# Patient Record
Sex: MALE | Race: WHITE | Employment: FULL TIME | URBAN - METROPOLITAN AREA
[De-identification: names, ages, dates, MRNs, and addresses within clinical notes are randomized per-mention and may not be internally consistent; named-entity substitution may affect disease eponyms.]

---

## 2017-01-13 DIAGNOSIS — E78.5 HYPERLIPIDEMIA WITH TARGET LDL LESS THAN 130: Primary | ICD-10-CM

## 2017-01-13 RX ORDER — ATORVASTATIN CALCIUM 20 MG/1
TABLET, FILM COATED ORAL
Qty: 90 TABLET | Refills: 2 | Status: SHIPPED | OUTPATIENT
Start: 2017-01-13 | End: 2023-06-09

## 2017-01-13 NOTE — TELEPHONE ENCOUNTER
Prescription approved per [facility] Refill Protocol.  Maria Esther Baker RN

## 2017-01-13 NOTE — TELEPHONE ENCOUNTER
atorvastatin (LIPITOR) 20 MG tablet 90 tablet 1 5/20/2016          Last Written Prescription Date: 05/20/2016  Last Fill Quantity: 90, # refills: 1  Last Office Visit with [facility], [facility] or [facility] prescribing provider: 10/31/2016       CHOL      182   10/31/2016  HDL       50   10/31/2016  LDL       80   10/31/2016  TRIG      259   10/31/2016  CHOLHDLRATIO      3.2   10/5/2015

## 2017-03-02 ENCOUNTER — OFFICE VISIT (OUTPATIENT)
Dept: FAMILY MEDICINE | Facility: CLINIC | Age: 57
End: 2017-03-02
Payer: COMMERCIAL

## 2017-03-02 VITALS
WEIGHT: 212.5 LBS | OXYGEN SATURATION: 98 % | TEMPERATURE: 97.5 F | HEART RATE: 58 BPM | SYSTOLIC BLOOD PRESSURE: 124 MMHG | RESPIRATION RATE: 16 BRPM | DIASTOLIC BLOOD PRESSURE: 68 MMHG | BODY MASS INDEX: 33.35 KG/M2 | HEIGHT: 67 IN

## 2017-03-02 DIAGNOSIS — J01.90 ACUTE SINUSITIS WITH SYMPTOMS > 10 DAYS: Primary | ICD-10-CM

## 2017-03-02 PROCEDURE — 99213 OFFICE O/P EST LOW 20 MIN: CPT | Performed by: PHYSICIAN ASSISTANT

## 2017-03-02 RX ORDER — AMOXICILLIN 875 MG
875 TABLET ORAL 2 TIMES DAILY
Qty: 20 TABLET | Refills: 0 | Status: SHIPPED | OUTPATIENT
Start: 2017-03-02 | End: 2023-06-09

## 2017-03-02 NOTE — PROGRESS NOTES
SUBJECTIVE:                                                    Jj Stallworth is a 56 year old male who presents to clinic today for the following health issues:      RESPIRATORY SYMPTOMS      Duration: 3 weeks    Description  nasal congestion, cough and intermittent fever    Severity: moderate    Accompanying signs and symptoms: None    History (predisposing factors):  none    Precipitating or alleviating factors: None    Therapies tried and outcome:  Dayquil, Nyquil   Sinus pain on maxillary sinuses B/L.   Cough is productive of mucus, but his worse in head/ facial area.  Fever off and on -- avg. temp .    Problem list and histories reviewed & adjusted, as indicated.  Additional history: as documented    Patient Active Problem List   Diagnosis     Hyperlipidemia with target LDL less than 130     Hypertriglyceridemia     Hyperplastic colon polyp     Obesity     Genital herpes     Low HDL (under 40)     Past Surgical History   Procedure Laterality Date     C nonspecific procedure  2002     fatty tumor removed from posterior neck  - done at [facility] in [place], [place]     Hemorrhoidectomy  2003     rubber band ligation     C nonspecific procedure  July 2003     treamill stress myocardial perfusion scan - done in [place], [place] - negative for ischemia or infarct.       Social History   Substance Use Topics     Smoking status: Former Smoker     Packs/day: 1.00     Years: 5.00     Types: Cigarettes     Quit date: 1/1/1978     Smokeless tobacco: Never Used     Alcohol use 0.0 oz/week     0 Standard drinks or equivalent per week      Comment: Socially- 2 drinks per week     Family History   Problem Relation Age of Onset     Arthritis Mother      Respiratory Mother      Bronchitis     Alcohol/Drug Father      Arthritis Father      Blood Disease Father      Partial blood clot- On blood thinner     Lipids Father      On med     Arthritis Maternal Grandmother      Arthritis Maternal Grandfather      HEART  DISEASE Maternal Grandfather      DIABETES Paternal Grandmother      Arthritis Paternal Grandmother      HEART DISEASE Paternal Grandmother      Arthritis Paternal Grandfather      CANCER Paternal Grandfather      Bone Cancer     HEART DISEASE Paternal Grandfather      Asthma Brother      Cancer - colorectal Paternal Aunt      dx age 60     Prostate Cancer No family hx of          Current Outpatient Prescriptions   Medication Sig Dispense Refill     atorvastatin (LIPITOR) 20 MG tablet TAKE 1 TABLET BY MOUTH DAILY 90 tablet 2     ibuprofen (ADVIL,MOTRIN) 800 MG tablet TAKE 1 TABLET BY MOUTH EVERY EIGHT HOURS AS NEEDED FOR PAIN 180 tablet 1     valACYclovir (VALTREX) 1000 mg tablet Take 1 tablet (1,000 mg) by mouth daily 60 tablet 5     diclofenac (VOLTAREN) 1 % GEL Apply 4 grams to knees or 2 grams to hands four times daily using enclosed dosing card. 100 g 5     sildenafil (VIAGRA) 50 MG tablet Take 1 tablet by mouth. 1 tablet at least 30 minutes before intercourse. 6 tablet 6     Allergies   Allergen Reactions     No Known Drug Allergy        Reviewed and updated as needed this visit by clinical staff       Reviewed and updated as needed this visit by Provider         ROS:  C: POSITIVE for fever  INTEGUMENTARY/SKIN: NEGATIVE for worrisome rashes, moles or lesions  E/M: POSITIVE for congestion, runny nose and facial pain  R: POSITIVE for cough  CV: NEGATIVE for chest pain, palpitations or peripheral edema  GI: NEGATIVE for nausea, abdominal pain, heartburn, or change in bowel habits  : NEGATIVE for dysuria, hematuria, decreased urinary stream or discharge  MUSCULOSKELETAL: NEGATIVE for significant arthralgias or myalgia  NEURO: NEGATIVE for weakness, dizziness or paresthesias  ENDOCRINE: NEGATIVE for cold intolerance, HX diabetes and HX thyroid disease  HEME/ALLERGY/IMMUNE: NEGATIVE for swollen nodes    OBJECTIVE:                                                    /68 (BP Location: Left arm, Patient  "Position: Chair, Cuff Size: Adult Regular)  Pulse 58  Temp 97.5  F (36.4  C) (Tympanic)  Resp 16  Ht 5' 7\" (1.702 m)  Wt 212 lb 8 oz (96.4 kg)  SpO2 98%  BMI 33.28 kg/m2  Body mass index is 33.28 kg/(m^2).  GENERAL: healthy, alert and no distress  EYES: Eyes grossly normal to inspection, PERRL and conjunctivae and sclerae normal  HENT: normal cephalic/atraumatic, ear canals and TM's normal, nasal mucosa edematous , oropharynx clear, oral mucous membranes moist, tonsillar erythema and sinuses: maxillary tenderness on bilateral  NECK: no adenopathy, no asymmetry, masses, or scars and thyroid normal to palpation  RESP: lungs clear to auscultation - no rales, rhonchi or wheezes  CV: regular rate and rhythm, normal S1 S2, no S3 or S4, no murmur, click or rub, no peripheral edema and peripheral pulses strong  ABDOMEN: soft, nontender, no hepatosplenomegaly, no masses and bowel sounds normal  MS: no gross musculoskeletal defects noted, no edema    Diagnostic Test Results:  none      ASSESSMENT/PLAN:                                                    1. Acute sinusitis with symptoms > 10 days        Push fluids and rest. Humidified air at night.   - amoxicillin (AMOXIL) 875 MG tablet; Take 1 tablet (875 mg) by mouth 2 times daily  Dispense: 20 tablet; Refill: 0  I have discussed any lab or imaging results, the patient's diagnosis, and my plan of treatment with the patient and/or family. Patient is aware to come back in with worsening symptoms or if no relief despite treatment plan.  Patient voiced understanding and had no further questions.     Rhonda Woody PA-C  [facility]    "

## 2017-03-02 NOTE — PATIENT INSTRUCTIONS
Push fluids and rest  Claritin is an option for persistent cough  Humidified air  Acute Bacterial Rhinosinusitis (ABRS)  Acute bacterial rhinosinusitis (ABRS) is an infection of your nasal cavity and sinuses. It s caused by bacteria. Acute means that you ve had symptoms for less than 12 weeks.  Understanding your sinuses  The nasal cavity is the large air-filled space behind your nose. The sinuses are a group of spaces formed by the bones of your face. They connect with your nasal cavity. ABRS causes the tissue lining these spaces to become inflamed. Mucus may not drain normally. This leads to facial pain and other symptoms.  What causes ABRS?  ABRS most often follows an upper respiratory infection caused by a virus. Bacteria then infect the lining of your nasal cavity and sinuses. But you can also get ABRS if you have:    Nasal allergies    Long-term nasal swelling and congestion not caused by allergies    Blockage in the nose  Symptoms of ABRS  The symptoms of ABRS may be different for each person, and can include:    Nasal congestion    Runny nose    Fluid draining from the nose down the throat (postnasal drip)    Headache    Cough    Pain in the sinuses    Thick, colored fluid from the nose (mucus)    Fever  Diagnosing ABRS  ABRS may be diagnosed if you ve had an upper respiratory infection like a cold and cough for longer than 10 to 14 days. Your health care provider will ask about your symptoms and your medical history. The provider will check your vital signs, including your temperature. You ll have a physical exam. The health care provider will check your ears, nose, and throat. You likely won t need any tests. If ABRS comes back, you may have a culture or other tests.  Treatment for ABRS  Treatment may include:    Antibiotic medicine. This is for symptoms that last for at least 10 to 14 days.    Nasal corticosteroid medicine. Drops or spray used in the nose can lessen swelling and  congestion.    Over-the-counter pain medicine. This is to lessen sinus pain and pressure.    Nasal decongestant medicine. Spray or drops may help to lessen congestion. Do not use them for more than a few days.    Salt wash (saline irrigation). This can help to loosen mucus.  Possible complications of ABRS  ABRS may come back or become long-term (chronic).  In rare cases, ABRS may cause complications such as:     Inflamed tissue around the brain and spinal cord (meningitis)    Inflamed tissue around the eyes (orbital cellulitis)    Inflamed bones around the sinuses (osteitis)  These problems may need to be treated in a hospital with intravenous (IV) antibiotic medicine or surgery.  When to call the health care provider  Call your health care provider if you have any of the following:    Symptoms that don t get better, or get worse    Symptoms that don t get better after 3 to 5 days on antibiotics    Trouble seeing    Swelling around your eyes    Confusion or trouble staying awake        3819-1583 [employer]. [address] [place] [address], [place] [address]. All rights reserved. This information is not intended as a substitute for professional medical care. Always follow your healthcare professional's instructions.

## 2017-03-02 NOTE — MR AVS SNAPSHOT
After Visit Summary   3/2/2017    Jj Stallworth    MRN: [id number]           Patient Information     Date Of Birth          1960        Visit Information        Provider Department      3/2/2017 9:20 AM Rhonda Woody PA-C [facility]        Today's Diagnoses     Acute sinusitis with symptoms > 10 days    -  1      Care Instructions    Push fluids and rest  Claritin is an option for persistent cough  Humidified air  Acute Bacterial Rhinosinusitis (ABRS)  Acute bacterial rhinosinusitis (ABRS) is an infection of your nasal cavity and sinuses. It s caused by bacteria. Acute means that you ve had symptoms for less than 12 weeks.  Understanding your sinuses  The nasal cavity is the large air-filled space behind your nose. The sinuses are a group of spaces formed by the bones of your face. They connect with your nasal cavity. ABRS causes the tissue lining these spaces to become inflamed. Mucus may not drain normally. This leads to facial pain and other symptoms.  What causes ABRS?  ABRS most often follows an upper respiratory infection caused by a virus. Bacteria then infect the lining of your nasal cavity and sinuses. But you can also get ABRS if you have:    Nasal allergies    Long-term nasal swelling and congestion not caused by allergies    Blockage in the nose  Symptoms of ABRS  The symptoms of ABRS may be different for each person, and can include:    Nasal congestion    Runny nose    Fluid draining from the nose down the throat (postnasal drip)    Headache    Cough    Pain in the sinuses    Thick, colored fluid from the nose (mucus)    Fever  Diagnosing ABRS  ABRS may be diagnosed if you ve had an upper respiratory infection like a cold and cough for longer than 10 to 14 days. Your health care provider will ask about your symptoms and your medical history. The provider will check your vital signs, including your temperature. You ll have a physical exam. The health care  provider will check your ears, nose, and throat. You likely won t need any tests. If ABRS comes back, you may have a culture or other tests.  Treatment for ABRS  Treatment may include:    Antibiotic medicine. This is for symptoms that last for at least 10 to 14 days.    Nasal corticosteroid medicine. Drops or spray used in the nose can lessen swelling and congestion.    Over-the-counter pain medicine. This is to lessen sinus pain and pressure.    Nasal decongestant medicine. Spray or drops may help to lessen congestion. Do not use them for more than a few days.    Salt wash (saline irrigation). This can help to loosen mucus.  Possible complications of ABRS  ABRS may come back or become long-term (chronic).  In rare cases, ABRS may cause complications such as:     Inflamed tissue around the brain and spinal cord (meningitis)    Inflamed tissue around the eyes (orbital cellulitis)    Inflamed bones around the sinuses (osteitis)  These problems may need to be treated in a hospital with intravenous (IV) antibiotic medicine or surgery.  When to call the health care provider  Call your health care provider if you have any of the following:    Symptoms that don t get better, or get worse    Symptoms that don t get better after 3 to 5 days on antibiotics    Trouble seeing    Swelling around your eyes    Confusion or trouble staying awake        8522-0280 [employer]. [address] [place] [address], [place] [address]. All rights reserved. This information is not intended as a substitute for professional medical care. Always follow your healthcare professional's instructions.              Follow-ups after your visit        Who to contact     If you have questions or need follow up information about today's clinic visit or your schedule please contact [facility] directly at [phone].  Normal or non-critical lab and imaging results will be communicated to you by MyChart, letter or phone within 4  "business days after the clinic has received the results. If you do not hear from us within 7 days, please contact the clinic through [employer] or phone. If you have a critical or abnormal lab result, we will notify you by phone as soon as possible.  Submit refill requests through [employer] or call your pharmacy and they will forward the refill request to us. Please allow 3 business days for your refill to be completed.          Additional Information About Your Visit        [employer] Information     [employer] gives you secure access to your electronic health record. If you see a primary care provider, you can also send messages to your care team and make appointments. If you have questions, please call your primary care clinic.  If you do not have a primary care provider, please call [phone] and they will assist you.        Care EveryWhere ID     This is your Care EveryWhere ID. This could be used by other organizations to access your [place] medical records  [id number]        Your Vitals Were     Pulse Temperature Respirations Height Pulse Oximetry BMI (Body Mass Index)    58 97.5  F (36.4  C) (Tympanic) 16 5' 7\" (1.702 m) 98% 33.28 kg/m2       Blood Pressure from Last 3 Encounters:   03/02/17 124/68   10/31/16 112/73   10/05/15 137/85    Weight from Last 3 Encounters:   03/02/17 212 lb 8 oz (96.4 kg)   10/31/16 201 lb (91.2 kg)   10/05/15 204 lb (92.5 kg)              Today, you had the following     No orders found for display         Today's Medication Changes          These changes are accurate as of: 3/2/17  9:31 AM.  If you have any questions, ask your nurse or doctor.               Start taking these medicines.        Dose/Directions    amoxicillin 875 MG tablet   Commonly known as:  AMOXIL   Used for:  Acute sinusitis with symptoms > 10 days   Started by:  Rhonda Woody PA-C        Dose:  875 mg   Take 1 tablet (875 mg) by mouth 2 times daily   Quantity:  20 tablet   Refills:  0            Where to " get your medicines      These medications were sent to [facility] [place] - [place], [place] - [address]  [address], [facility] [address]     Phone:  [phone]     amoxicillin 875 MG tablet                Primary Care Provider Office Phone # Fax #    Dilip Bocanegra Bolivar Frank [phone]       [facility] [address]  [place] [address]        Thank you!     Thank you for choosing [facility]  for your care. Our goal is always to provide you with excellent care. Hearing back from our patients is one way we can continue to improve our services. Please take a few minutes to complete the written survey that you may receive in the mail after your visit with us. Thank you!             Your Updated Medication List - Protect others around you: Learn how to safely use, store and throw away your medicines at www.disposemymeds.org.          This list is accurate as of: 3/2/17  9:31 AM.  Always use your most recent med list.                   Brand Name Dispense Instructions for use    amoxicillin 875 MG tablet    AMOXIL    20 tablet    Take 1 tablet (875 mg) by mouth 2 times daily       atorvastatin 20 MG tablet    LIPITOR    90 tablet    TAKE 1 TABLET BY MOUTH DAILY       diclofenac 1 % Gel topical gel    VOLTAREN    100 g    Apply 4 grams to knees or 2 grams to hands four times daily using enclosed dosing card.       ibuprofen 800 MG tablet    ADVIL/MOTRIN    180 tablet    TAKE 1 TABLET BY MOUTH EVERY EIGHT HOURS AS NEEDED FOR PAIN       sildenafil 50 MG cap/tab    VIAGRA    6 tablet    Take 1 tablet by mouth. 1 tablet at least 30 minutes before intercourse.       valACYclovir 1000 mg tablet    VALTREX    60 tablet    Take 1 tablet (1,000 mg) by mouth daily

## 2017-03-02 NOTE — NURSING NOTE
"Chief Complaint   Patient presents with     URI       Initial /68 (BP Location: Left arm, Patient Position: Chair, Cuff Size: Adult Regular)  Pulse 58  Temp 97.5  F (36.4  C) (Tympanic)  Resp 16  Ht 5' 7\" (1.702 m)  Wt 212 lb 8 oz (96.4 kg)  SpO2 98%  BMI 33.28 kg/m2 Estimated body mass index is 33.28 kg/(m^2) as calculated from the following:    Height as of this encounter: 5' 7\" (1.702 m).    Weight as of this encounter: 212 lb 8 oz (96.4 kg).  Medication Reconciliation: complete     Dana Mark CMA    "

## 2017-05-18 DIAGNOSIS — A60.00 GENITAL HERPES SIMPLEX, UNSPECIFIED SITE: ICD-10-CM

## 2017-05-18 NOTE — TELEPHONE ENCOUNTER
Is dose daily or BID?  SIG says daily but quantity dispensed is enough for BID.  Please update either dosing or quantity.    Pending Prescriptions:                       Disp   Refills    valACYclovir (VALTREX) 1000 mg tablet     60 tab*5            Sig: Take 1 tablet (1,000 mg) by mouth daily         Last Written Prescription Date: 11-11-16  Last Fill Quantity: 60, # refills: 5  [facility] Visit with [facility] or [facility] prescribing provider: 10-31-16 Zac        Creatinine   Date Value Ref Range Status   10/31/2016 0.92 0.66 - 1.25 mg/dL Final     RT Oumou (R)

## 2017-05-19 RX ORDER — VALACYCLOVIR HYDROCHLORIDE 1 G/1
1000 TABLET, FILM COATED ORAL DAILY
Qty: 90 TABLET | Refills: 1 | Status: SHIPPED | OUTPATIENT
Start: 2017-05-19 | End: 2023-06-09

## 2017-05-19 NOTE — TELEPHONE ENCOUNTER
Prescription approved per [facility] Refill Protocol.  Daily dosing.  Elena Alvarez RN

## 2018-04-19 ENCOUNTER — HOSPITAL ENCOUNTER (OUTPATIENT)
Facility: CLINIC | Age: 58
Discharge: HOME | End: 2018-04-19
Attending: COLON & RECTAL SURGERY | Admitting: COLON & RECTAL SURGERY
Payer: COMMERCIAL

## 2018-04-19 ENCOUNTER — SURGERY (OUTPATIENT)
Age: 58
End: 2018-04-19

## 2018-04-19 VITALS
DIASTOLIC BLOOD PRESSURE: 88 MMHG | HEIGHT: 68 IN | OXYGEN SATURATION: 100 % | RESPIRATION RATE: 12 BRPM | BODY MASS INDEX: 30.31 KG/M2 | SYSTOLIC BLOOD PRESSURE: 128 MMHG | WEIGHT: 200 LBS

## 2018-04-19 LAB — COLONOSCOPY: NORMAL

## 2018-04-19 PROCEDURE — 45380 COLONOSCOPY AND BIOPSY: CPT | Performed by: COLON & RECTAL SURGERY

## 2018-04-19 PROCEDURE — 25000128 H RX IP 250 OP 636: Performed by: COLON & RECTAL SURGERY

## 2018-04-19 PROCEDURE — 88305 TISSUE EXAM BY PATHOLOGIST: CPT | Mod: 26 | Performed by: COLON & RECTAL SURGERY

## 2018-04-19 PROCEDURE — 88305 TISSUE EXAM BY PATHOLOGIST: CPT | Performed by: COLON & RECTAL SURGERY

## 2018-04-19 PROCEDURE — G0500 MOD SEDAT ENDO SERVICE >5YRS: HCPCS | Performed by: COLON & RECTAL SURGERY

## 2018-04-19 RX ORDER — NALOXONE HYDROCHLORIDE 0.4 MG/ML
.1-.4 INJECTION, SOLUTION INTRAMUSCULAR; INTRAVENOUS; SUBCUTANEOUS
Status: DISCONTINUED | OUTPATIENT
Start: 2018-04-19 | End: 2018-04-19 | Stop reason: HOSPADM

## 2018-04-19 RX ORDER — ONDANSETRON 4 MG/1
4 TABLET, ORALLY DISINTEGRATING ORAL EVERY 6 HOURS PRN
Status: DISCONTINUED | OUTPATIENT
Start: 2018-04-19 | End: 2018-04-19 | Stop reason: HOSPADM

## 2018-04-19 RX ORDER — ONDANSETRON 2 MG/ML
4 INJECTION INTRAMUSCULAR; INTRAVENOUS
Status: DISCONTINUED | OUTPATIENT
Start: 2018-04-19 | End: 2018-04-19 | Stop reason: HOSPADM

## 2018-04-19 RX ORDER — FENTANYL CITRATE 50 UG/ML
INJECTION, SOLUTION INTRAMUSCULAR; INTRAVENOUS PRN
Status: DISCONTINUED | OUTPATIENT
Start: 2018-04-19 | End: 2018-04-19 | Stop reason: HOSPADM

## 2018-04-19 RX ORDER — ONDANSETRON 2 MG/ML
4 INJECTION INTRAMUSCULAR; INTRAVENOUS EVERY 6 HOURS PRN
Status: DISCONTINUED | OUTPATIENT
Start: 2018-04-19 | End: 2018-04-19 | Stop reason: HOSPADM

## 2018-04-19 RX ORDER — FLUMAZENIL 0.1 MG/ML
0.2 INJECTION, SOLUTION INTRAVENOUS
Status: DISCONTINUED | OUTPATIENT
Start: 2018-04-19 | End: 2018-04-19 | Stop reason: HOSPADM

## 2018-04-19 RX ORDER — LIDOCAINE 40 MG/G
CREAM TOPICAL
Status: DISCONTINUED | OUTPATIENT
Start: 2018-04-19 | End: 2018-04-19 | Stop reason: HOSPADM

## 2018-04-19 RX ADMIN — FENTANYL CITRATE 50 MCG: 50 INJECTION, SOLUTION INTRAMUSCULAR; INTRAVENOUS at 13:12

## 2018-04-19 RX ADMIN — FENTANYL CITRATE 100 MCG: 50 INJECTION, SOLUTION INTRAMUSCULAR; INTRAVENOUS at 13:05

## 2018-04-19 RX ADMIN — MIDAZOLAM 2 MG: 1 INJECTION INTRAMUSCULAR; INTRAVENOUS at 13:05

## 2018-04-19 NOTE — H&P
Pre-Endoscopy History and Physical     Jj Satllworth MRN# [id number]   YOB: 1960 Age: 57 year old     Date of Procedure: 4/19/2018  Primary care provider: Fredi Reich  Type of Endoscopy: Colonoscopy  Reason for Procedure: Screening  Type of Anesthesia Anticipated: Moderate Sedation    HPI:    Jj is a 57 year old male who will be undergoing the above procedure.      A history and physical has been performed. The patient's medications and allergies have been reviewed. The risks and benefits of the procedure and the sedation options and risks were discussed with the patient.  All questions were answered and informed consent was obtained.      He denies a personal or family history of anesthesia complications or bleeding disorders.     Allergies   Allergen Reactions     No Known Drug Allergy           No current facility-administered medications on file prior to encounter.   Current Outpatient Prescriptions on File Prior to Encounter:  amoxicillin (AMOXIL) 875 MG tablet Take 1 tablet (875 mg) by mouth 2 times daily   atorvastatin (LIPITOR) 20 MG tablet TAKE 1 TABLET BY MOUTH DAILY   diclofenac (VOLTAREN) 1 % GEL Apply 4 grams to knees or 2 grams to hands four times daily using enclosed dosing card.   ibuprofen (ADVIL,MOTRIN) 800 MG tablet TAKE 1 TABLET BY MOUTH EVERY EIGHT HOURS AS NEEDED FOR PAIN   sildenafil (VIAGRA) 50 MG tablet Take 1 tablet by mouth. 1 tablet at least 30 minutes before intercourse.   valACYclovir (VALTREX) 1000 mg tablet Take 1 tablet (1,000 mg) by mouth daily       Patient Active Problem List   Diagnosis     Hyperlipidemia with target LDL less than 130     Hypertriglyceridemia     Hyperplastic colon polyp     Obesity     Genital herpes     Low HDL (under 40)        Past Medical History:   Diagnosis Date     High cholesterol      NO ACTIVE PROBLEMS         Past Surgical History:   Procedure Laterality Date     C NONSPECIFIC PROCEDURE  2002    fatty tumor removed  "from posterior neck  - done at [facility] in [place], [place]     C NONSPECIFIC PROCEDURE  July 2003    treamill stress myocardial perfusion scan - done in [place], [place] - negative for ischemia or infarct.     HEMORRHOIDECTOMY  2003    rubber band ligation       Social History   Substance Use Topics     Smoking status: Former Smoker     Packs/day: 1.00     Years: 5.00     Types: Cigarettes     Quit date: 1/1/1978     Smokeless tobacco: Never Used     Alcohol use 0.0 oz/week     0 Standard drinks or equivalent per week      Comment: Socially- 2 drinks per week       Family History   Problem Relation Age of Onset     Arthritis Mother      Respiratory Mother      Bronchitis     Alcohol/Drug Father      Arthritis Father      Blood Disease Father      Partial blood clot- On blood thinner     Lipids Father      On med     Arthritis Maternal Grandmother      Arthritis Maternal Grandfather      HEART DISEASE Maternal Grandfather      DIABETES Paternal Grandmother      Arthritis Paternal Grandmother      HEART DISEASE Paternal Grandmother      Arthritis Paternal Grandfather      CANCER Paternal Grandfather      Bone Cancer     HEART DISEASE Paternal Grandfather      Asthma Brother      Cancer - colorectal Paternal Aunt      dx age 60     Prostate Cancer No family hx of        REVIEW OF SYSTEMS:     5 point ROS negative except as noted above in HPI, including Gen., Resp., CV, GI &  system review.      PHYSICAL EXAM:   BP (!) 133/94  Resp 9  Ht 1.727 m (5' 8\")  Wt 90.7 kg (200 lb)  SpO2 100%  BMI 30.41 kg/m2 Estimated body mass index is 30.41 kg/(m^2) as calculated from the following:    Height as of this encounter: 1.727 m (5' 8\").    Weight as of this encounter: 90.7 kg (200 lb).   GENERAL APPEARANCE: healthy and alert  MENTAL STATUS: alert  RESP: lungs clear to auscultation - no rales, rhonchi or wheezes  CV: regular rates and rhythm and normal S1 S2, no S3 or S4      IMPRESSION   ASA Class 2 - Mild " systemic disease        PLAN:     Plan for colonoscopy. We discussed the risks, benefits and alternatives and the patient wished to proceed.    The above has been forwarded to the consulting provider.      Dilip Santos MD  [facility]  Phone: [phone]  April 19, 2018

## 2018-04-20 LAB — COPATH REPORT: NORMAL

## 2019-11-08 ENCOUNTER — HEALTH MAINTENANCE LETTER (OUTPATIENT)
Age: 59
End: 2019-11-08

## 2020-12-06 ENCOUNTER — HEALTH MAINTENANCE LETTER (OUTPATIENT)
Age: 60
End: 2020-12-06

## 2021-09-25 ENCOUNTER — HEALTH MAINTENANCE LETTER (OUTPATIENT)
Age: 61
End: 2021-09-25

## 2022-01-15 ENCOUNTER — HEALTH MAINTENANCE LETTER (OUTPATIENT)
Age: 62
End: 2022-01-15

## 2022-08-29 ENCOUNTER — APPOINTMENT (OUTPATIENT)
Dept: URBAN - METROPOLITAN AREA CLINIC 256 | Age: 62
Setting detail: DERMATOLOGY
End: 2022-08-29

## 2022-08-29 VITALS — HEIGHT: 68 IN | WEIGHT: 210 LBS

## 2022-08-29 DIAGNOSIS — L81.4 OTHER MELANIN HYPERPIGMENTATION: ICD-10-CM

## 2022-08-29 DIAGNOSIS — D18.0 HEMANGIOMA: ICD-10-CM

## 2022-08-29 DIAGNOSIS — Z71.89 OTHER SPECIFIED COUNSELING: ICD-10-CM

## 2022-08-29 DIAGNOSIS — L82.1 OTHER SEBORRHEIC KERATOSIS: ICD-10-CM

## 2022-08-29 DIAGNOSIS — L57.0 ACTINIC KERATOSIS: ICD-10-CM

## 2022-08-29 DIAGNOSIS — D22 MELANOCYTIC NEVI: ICD-10-CM

## 2022-08-29 DIAGNOSIS — Z87.2 PERSONAL HISTORY OF DISEASES OF THE SKIN AND SUBCUTANEOUS TISSUE: ICD-10-CM

## 2022-08-29 PROBLEM — D22.5 MELANOCYTIC NEVI OF TRUNK: Status: ACTIVE | Noted: 2022-08-29

## 2022-08-29 PROBLEM — D18.01 HEMANGIOMA OF SKIN AND SUBCUTANEOUS TISSUE: Status: ACTIVE | Noted: 2022-08-29

## 2022-08-29 PROBLEM — D23.71 OTHER BENIGN NEOPLASM OF SKIN OF RIGHT LOWER LIMB, INCLUDING HIP: Status: ACTIVE | Noted: 2022-08-29

## 2022-08-29 PROCEDURE — 17003 DESTRUCT PREMALG LES 2-14: CPT

## 2022-08-29 PROCEDURE — OTHER COUNSELING: OTHER

## 2022-08-29 PROCEDURE — 17000 DESTRUCT PREMALG LESION: CPT

## 2022-08-29 PROCEDURE — OTHER LIQUID NITROGEN: OTHER

## 2022-08-29 PROCEDURE — OTHER MIPS QUALITY: OTHER

## 2022-08-29 PROCEDURE — 99203 OFFICE O/P NEW LOW 30 MIN: CPT | Mod: 25

## 2022-08-29 ASSESSMENT — LOCATION DETAILED DESCRIPTION DERM
LOCATION DETAILED: RIGHT MID-UPPER BACK
LOCATION DETAILED: NASAL DORSUM
LOCATION DETAILED: RIGHT SUPERIOR UPPER BACK
LOCATION DETAILED: LEFT CENTRAL EYEBROW
LOCATION DETAILED: LEFT INFERIOR FOREHEAD
LOCATION DETAILED: RIGHT INFERIOR UPPER BACK
LOCATION DETAILED: INFERIOR LUMBAR SPINE

## 2022-08-29 ASSESSMENT — LOCATION SIMPLE DESCRIPTION DERM
LOCATION SIMPLE: NOSE
LOCATION SIMPLE: RIGHT UPPER BACK
LOCATION SIMPLE: LOWER BACK
LOCATION SIMPLE: LEFT FOREHEAD
LOCATION SIMPLE: LEFT EYEBROW

## 2022-08-29 ASSESSMENT — LOCATION ZONE DERM
LOCATION ZONE: FACE
LOCATION ZONE: NOSE
LOCATION ZONE: TRUNK

## 2022-08-29 NOTE — PROCEDURE: COUNSELING
Detail Level: Generalized
Detail Level: Detailed
Patient Specific Counseling (Will Not Stick From Patient To Patient): -No signs of recurrence

## 2022-08-29 NOTE — PROCEDURE: LIQUID NITROGEN
Detail Level: Detailed
Consent: The patient's consent was obtained including but not limited to risks of crusting, scabbing, blistering, scarring, darker or lighter pigmentary change, recurrence, incomplete removal and infection.
Duration Of Freeze Thaw-Cycle (Seconds): 10
Number Of Freeze-Thaw Cycles: 3 freeze-thaw cycles
Show Aperture Variable?: Yes
Render Note In Bullet Format When Appropriate: No
Post-Care Instructions: I reviewed with the patient in detail post-care instructions. Patient is to wear sunprotection, and avoid picking at any of the treated lesions. Pt may apply Vaseline to crusted or scabbing areas.

## 2023-01-07 ENCOUNTER — HEALTH MAINTENANCE LETTER (OUTPATIENT)
Age: 63
End: 2023-01-07

## 2023-01-19 ENCOUNTER — TRANSFERRED RECORDS (OUTPATIENT)
Dept: MULTI SPECIALTY CLINIC | Facility: CLINIC | Age: 63
End: 2023-01-19

## 2023-04-22 ENCOUNTER — HEALTH MAINTENANCE LETTER (OUTPATIENT)
Age: 63
End: 2023-04-22

## 2023-06-02 ASSESSMENT — ENCOUNTER SYMPTOMS
WEAKNESS: 0
MYALGIAS: 0
HEMATURIA: 0
DYSURIA: 0
HEADACHES: 1
NERVOUS/ANXIOUS: 0
HEARTBURN: 0
ABDOMINAL PAIN: 0
HEMATOCHEZIA: 0
CONSTIPATION: 0
DIZZINESS: 0
FEVER: 0
PALPITATIONS: 0
FREQUENCY: 0
ARTHRALGIAS: 0
CHILLS: 0
JOINT SWELLING: 0
DIARRHEA: 0
SHORTNESS OF BREATH: 0
SORE THROAT: 0
COUGH: 0
EYE PAIN: 0
PARESTHESIAS: 0
NAUSEA: 0

## 2023-06-09 ENCOUNTER — OFFICE VISIT (OUTPATIENT)
Dept: FAMILY MEDICINE | Facility: CLINIC | Age: 63
End: 2023-06-09
Payer: COMMERCIAL

## 2023-06-09 VITALS
HEIGHT: 67 IN | SYSTOLIC BLOOD PRESSURE: 137 MMHG | WEIGHT: 206 LBS | DIASTOLIC BLOOD PRESSURE: 78 MMHG | OXYGEN SATURATION: 98 % | HEART RATE: 55 BPM | RESPIRATION RATE: 22 BRPM | TEMPERATURE: 97.8 F | BODY MASS INDEX: 32.33 KG/M2

## 2023-06-09 DIAGNOSIS — Z51.81 ENCOUNTER FOR THERAPEUTIC DRUG MONITORING: ICD-10-CM

## 2023-06-09 DIAGNOSIS — Z28.21 COVID-19 VACCINATION DECLINED: ICD-10-CM

## 2023-06-09 DIAGNOSIS — A60.00 GENITAL HERPES SIMPLEX, UNSPECIFIED SITE: ICD-10-CM

## 2023-06-09 DIAGNOSIS — R73.03 PREDIABETES: ICD-10-CM

## 2023-06-09 DIAGNOSIS — E78.5 HYPERLIPIDEMIA WITH TARGET LDL LESS THAN 130: ICD-10-CM

## 2023-06-09 DIAGNOSIS — Z00.00 ROUTINE GENERAL MEDICAL EXAMINATION AT A HEALTH CARE FACILITY: Primary | ICD-10-CM

## 2023-06-09 DIAGNOSIS — Z12.5 SCREENING FOR PROSTATE CANCER: ICD-10-CM

## 2023-06-09 LAB
ALBUMIN SERPL BCG-MCNC: 4.5 G/DL (ref 3.5–5.2)
ALP SERPL-CCNC: 105 U/L (ref 40–129)
ALT SERPL W P-5'-P-CCNC: 32 U/L (ref 10–50)
ANION GAP SERPL CALCULATED.3IONS-SCNC: 12 MMOL/L (ref 7–15)
AST SERPL W P-5'-P-CCNC: 24 U/L (ref 10–50)
BILIRUB SERPL-MCNC: 0.3 MG/DL
BUN SERPL-MCNC: 15 MG/DL (ref 8–23)
CALCIUM SERPL-MCNC: 9.4 MG/DL (ref 8.8–10.2)
CHLORIDE SERPL-SCNC: 102 MMOL/L (ref 98–107)
CHOLEST SERPL-MCNC: 177 MG/DL
CREAT SERPL-MCNC: 0.91 MG/DL (ref 0.67–1.17)
DEPRECATED HCO3 PLAS-SCNC: 23 MMOL/L (ref 22–29)
ERYTHROCYTE [DISTWIDTH] IN BLOOD BY AUTOMATED COUNT: 14.1 % (ref 10–15)
GFR SERPL CREATININE-BSD FRML MDRD: >90 ML/MIN/1.73M2
GLUCOSE SERPL-MCNC: 108 MG/DL (ref 70–99)
HBA1C MFR BLD: 5.9 % (ref 0–5.6)
HCT VFR BLD AUTO: 45.3 % (ref 40–53)
HDLC SERPL-MCNC: 64 MG/DL
HGB BLD-MCNC: 15 G/DL (ref 13.3–17.7)
LDLC SERPL CALC-MCNC: 85 MG/DL
MCH RBC QN AUTO: 27.5 PG (ref 26.5–33)
MCHC RBC AUTO-ENTMCNC: 33.1 G/DL (ref 31.5–36.5)
MCV RBC AUTO: 83 FL (ref 78–100)
NONHDLC SERPL-MCNC: 113 MG/DL
PLATELET # BLD AUTO: 236 10E3/UL (ref 150–450)
POTASSIUM SERPL-SCNC: 4.4 MMOL/L (ref 3.4–5.3)
PROT SERPL-MCNC: 7.5 G/DL (ref 6.4–8.3)
PSA SERPL DL<=0.01 NG/ML-MCNC: 0.77 NG/ML (ref 0–4.5)
RBC # BLD AUTO: 5.45 10E6/UL (ref 4.4–5.9)
SODIUM SERPL-SCNC: 137 MMOL/L (ref 136–145)
TRIGL SERPL-MCNC: 138 MG/DL
WBC # BLD AUTO: 5.9 10E3/UL (ref 4–11)

## 2023-06-09 PROCEDURE — G0103 PSA SCREENING: HCPCS | Performed by: FAMILY MEDICINE

## 2023-06-09 PROCEDURE — 36415 COLL VENOUS BLD VENIPUNCTURE: CPT | Performed by: FAMILY MEDICINE

## 2023-06-09 PROCEDURE — 99214 OFFICE O/P EST MOD 30 MIN: CPT | Mod: 25 | Performed by: FAMILY MEDICINE

## 2023-06-09 PROCEDURE — 83036 HEMOGLOBIN GLYCOSYLATED A1C: CPT | Performed by: FAMILY MEDICINE

## 2023-06-09 PROCEDURE — 85027 COMPLETE CBC AUTOMATED: CPT | Performed by: FAMILY MEDICINE

## 2023-06-09 PROCEDURE — 80061 LIPID PANEL: CPT | Performed by: FAMILY MEDICINE

## 2023-06-09 PROCEDURE — 80053 COMPREHEN METABOLIC PANEL: CPT | Performed by: FAMILY MEDICINE

## 2023-06-09 PROCEDURE — 99396 PREV VISIT EST AGE 40-64: CPT | Performed by: FAMILY MEDICINE

## 2023-06-09 RX ORDER — ATORVASTATIN CALCIUM 20 MG/1
20 TABLET, FILM COATED ORAL DAILY
Qty: 90 TABLET | Refills: 3 | Status: SHIPPED | OUTPATIENT
Start: 2023-06-09 | End: 2024-04-08

## 2023-06-09 RX ORDER — VALACYCLOVIR HYDROCHLORIDE 1 G/1
1000 TABLET, FILM COATED ORAL DAILY
Qty: 90 TABLET | Refills: 3 | Status: SHIPPED | OUTPATIENT
Start: 2023-06-09 | End: 2024-04-08

## 2023-06-09 ASSESSMENT — ENCOUNTER SYMPTOMS
ARTHRALGIAS: 0
DIARRHEA: 0
JOINT SWELLING: 0
CONSTIPATION: 0
HEMATOCHEZIA: 0
NERVOUS/ANXIOUS: 0
COUGH: 0
NAUSEA: 0
HEMATURIA: 0
HEARTBURN: 0
ABDOMINAL PAIN: 0
SORE THROAT: 0
FEVER: 0
PARESTHESIAS: 0
EYE PAIN: 0
CHILLS: 0
SHORTNESS OF BREATH: 0
PALPITATIONS: 0
HEADACHES: 1
FREQUENCY: 0
MYALGIAS: 0
DIZZINESS: 0
WEAKNESS: 0
DYSURIA: 0

## 2023-06-09 NOTE — PROGRESS NOTES
SUBJECTIVE:   CC: Jj is an 62 year old who presents for preventative health visit.       2023     8:17 AM   Additional Questions   Roomed by jeana   Accompanied by self     Healthy Habits:     Getting at least 3 servings of Calcium per day:  Yes    Bi-annual eye exam:  Yes    Dental care twice a year:  Yes    Sleep apnea or symptoms of sleep apnea:  None    Diet:  Regular (no restrictions)    Frequency of exercise:  4-5 days/week    Duration of exercise:  45-60 minutes    Taking medications regularly:  Yes    Medication side effects:  Muscle aches    PHQ-2 Total Score: 0    Additional concerns today:  No      Today's PHQ-2 Score:       2023     8:03 AM   PHQ-2 (  [employer])   Q1: Little interest or pleasure in doing things 0   Q2: Feeling down, depressed or hopeless 0   PHQ-2 Score 0   Q1: Little interest or pleasure in doing things Not at all   Q2: Feeling down, depressed or hopeless Not at all   PHQ-2 Score 0       Have you ever done Advance Care Planning? (For example, a Health Directive, POLST, or a discussion with a medical provider or your loved ones about your wishes): No, advance care planning information given to patient to review.  Patient plans to discuss their wishes with loved ones or provider.      Social History     Tobacco Use     Smoking status: Former     Packs/day: 1.00     Years: 5.00     Pack years: 5.00     Types: Cigarettes     Quit date: 1978     Years since quittin.4     Smokeless tobacco: Never   Vaping Use     Vaping status: Not on file   Substance Use Topics     Alcohol use: Yes     Alcohol/week: 0.0 standard drinks of alcohol     Comment: Socially- 2 drinks per week             2023     4:24 PM   Alcohol Use   Prescreen: >3 drinks/day or >7 drinks/week? No          View : No data to display.                Last PSA:   PSA   Date Value Ref Range Status   10/31/2016 0.48 0 - 4 ug/L Final     Comment:     Assay Method:  Chemiluminescence using Siemens Vista analyzer  "      Reviewed orders with patient. Reviewed health maintenance and updated orders accordingly - Yes      Reviewed and updated as needed this visit by clinical staff    Allergies  Meds              Reviewed and updated as needed this visit by Provider                     Review of Systems   Constitutional: Negative for chills and fever.   HENT: Negative for congestion, ear pain, hearing loss and sore throat.    Eyes: Negative for pain and visual disturbance.   Respiratory: Negative for cough and shortness of breath.    Cardiovascular: Negative for chest pain, palpitations and peripheral edema.   Gastrointestinal: Negative for abdominal pain, constipation, diarrhea, heartburn, hematochezia and nausea.   Genitourinary: Negative for dysuria, frequency, genital sores, hematuria, impotence, penile discharge and urgency.   Musculoskeletal: Negative for arthralgias, joint swelling and myalgias.   Skin: Negative for rash.   Neurological: Positive for headaches. Negative for dizziness, weakness and paresthesias.   Psychiatric/Behavioral: Negative for mood changes. The patient is not nervous/anxious.          OBJECTIVE:   /78 (BP Location: Right arm, Patient Position: Sitting, Cuff Size: Adult Regular)   Pulse 55   Temp 97.8  F (36.6  C) (Temporal)   Resp 22   Ht 1.69 m (5' 6.54\")   Wt 93.4 kg (206 lb)   SpO2 98%   BMI 32.72 kg/m    Physical Exam  GENERAL: healthy, alert and no distress  EYES: Eyes grossly normal to inspection,conjunctivae and sclerae normal  HENT: ear canals and TM's normal  NECK: no adenopathy, no asymmetry, masses, or scars and thyroid normal to palpation  RESP: lungs clear to auscultation - no rales, rhonchi or wheezes  CV: regular rate and rhythm, normal S1 S2  ABDOMEN: soft, nontender, no hepatosplenomegaly, no masses and bowel sounds normal  MS: no gross musculoskeletal defects noted, no edema  SKIN: no suspicious lesions or rashes  NEURO: Normal strength and tone, mentation intact and " speech normal  PSYCH: mentation appears normal, affect normal    Diagnostic Test Results:  Labs reviewed in Epic    ASSESSMENT/PLAN:     1. Routine general medical examination at a health care facility  - 1/19/23 colonoscopy [facility] - 3 polyps were removed, repeat colonoscopy in 5 years   - Followed by dermatologist   - up to date with immunizations     Hyperlipidemia with target LDL less than 130  - stable, refill below   - Lipid panel reflex to direct LDL Fasting; Future  - atorvastatin (LIPITOR) 20 MG tablet; Take 1 tablet (20 mg) by mouth daily  Dispense: 90 tablet; Refill: 3    Genital herpes simplex, unspecified site  - stable, refill below  - valACYclovir (VALTREX) 1000 mg tablet; Take 1 tablet (1,000 mg) by mouth daily  Dispense: 90 tablet; Refill: 3    Prediabetes  - Hemoglobin A1c; Future    Encounter for therapeutic drug monitoring  - CBC with platelets; Future  - Comprehensive metabolic panel (BMP + Alb, Alk Phos, ALT, AST, Total. Bili, TP); Future    Screening for prostate cancer  - PSA, screen; Future    COVID-19 vaccination declined        Patient has been advised of split billing requirements and indicates understanding: Yes      COUNSELING:   Reviewed preventive health counseling, as reflected in patient instructions        He reports that he quit smoking about 45 years ago. His smoking use included cigarettes. He has a 5.00 pack-year smoking history. He has never used smokeless tobacco.            Dana Dutta MD  [facility]

## 2023-08-07 ENCOUNTER — OFFICE VISIT (OUTPATIENT)
Dept: URGENT CARE | Facility: URGENT CARE | Age: 63
End: 2023-08-07
Payer: COMMERCIAL

## 2023-08-07 VITALS
HEART RATE: 82 BPM | OXYGEN SATURATION: 95 % | BODY MASS INDEX: 30.31 KG/M2 | TEMPERATURE: 97.9 F | DIASTOLIC BLOOD PRESSURE: 81 MMHG | WEIGHT: 200 LBS | RESPIRATION RATE: 15 BRPM | HEIGHT: 68 IN | SYSTOLIC BLOOD PRESSURE: 127 MMHG

## 2023-08-07 DIAGNOSIS — T14.8XXA PUNCTURE WOUND: Primary | ICD-10-CM

## 2023-08-07 PROCEDURE — 99213 OFFICE O/P EST LOW 20 MIN: CPT | Performed by: PHYSICIAN ASSISTANT

## 2023-08-07 RX ORDER — CETIRIZINE HYDROCHLORIDE 10 MG/1
10 TABLET ORAL DAILY
COMMUNITY

## 2023-08-07 NOTE — PROGRESS NOTES
Puncture wound  The area was cleaned with propyl alcohol.  Bacitracin was placed over the wound along with a Telfa dressing and a Coban compression wrap.  I have asked the patient to keep this in place for the next 48 hours before swapping out with a standard bandage.  If still bleeding at that time I would like the patient to be reevaluated.  Patient was educated on signs and symptoms of infection and asked to return to clinic if these present.    Efrem Olivo PA-C  [facility]    Subjective   63 year old who presents to clinic today for the following health issues:    Elbow Injury       HPI     About 24 hours ago the patient landed on his right elbow after a fall.  There was bleeding at the time and the patient placed a compression bandage over the top of it.  He attempted to change it out today and it started bleeding again.  Concerned that he might need stitches.  Patient denies any bony tenderness or loss of range of motion.  No radiating pain or numbness or tingling.    Review of Systems   Review of Systems   See HPI    Objective    Temp: 97.9  F (36.6  C) Temp src: Temporal BP: 127/81 Pulse: 82   Resp: 15 SpO2: 95 %       Physical Exam   Physical Exam  Constitutional:       General: He is not in acute distress.     Appearance: Normal appearance. He is normal weight. He is not ill-appearing, toxic-appearing or diaphoretic.   HENT:      Head: Normocephalic and atraumatic.   Cardiovascular:      Rate and Rhythm: Normal rate.      Pulses: Normal pulses.   Pulmonary:      Effort: Pulmonary effort is normal. No respiratory distress.   Skin:            Comments: There is a small 1 cm puncture wound in the area shown above that is with scant bleeding.  There does not appear to be any purulent discharge, erythema, surrounding swelling, or surrounding warmth.   Neurological:      General: No focal deficit present.      Mental Status: He is alert and oriented to person, place, and time. Mental  status is at baseline.      Gait: Gait normal.   Psychiatric:         Mood and Affect: Mood normal.         Behavior: Behavior normal.         Thought Content: Thought content normal.         Judgment: Judgment normal.          No results found for this or any previous visit (from the past 24 hour(s)).

## 2024-03-25 ENCOUNTER — OFFICE VISIT (OUTPATIENT)
Dept: URGENT CARE | Facility: URGENT CARE | Age: 64
End: 2024-03-25
Payer: COMMERCIAL

## 2024-03-25 VITALS
SYSTOLIC BLOOD PRESSURE: 120 MMHG | WEIGHT: 200 LBS | OXYGEN SATURATION: 96 % | BODY MASS INDEX: 30.31 KG/M2 | RESPIRATION RATE: 18 BRPM | HEART RATE: 55 BPM | TEMPERATURE: 97.9 F | HEIGHT: 68 IN | DIASTOLIC BLOOD PRESSURE: 62 MMHG

## 2024-03-25 DIAGNOSIS — J01.40 ACUTE NON-RECURRENT PANSINUSITIS: Primary | ICD-10-CM

## 2024-03-25 PROCEDURE — 99213 OFFICE O/P EST LOW 20 MIN: CPT | Performed by: PHYSICIAN ASSISTANT

## 2024-03-25 RX ORDER — CEFDINIR 300 MG/1
300 CAPSULE ORAL 2 TIMES DAILY
Qty: 20 CAPSULE | Refills: 0 | Status: SHIPPED | OUTPATIENT
Start: 2024-03-27 | End: 2024-04-06

## 2024-03-25 NOTE — PROGRESS NOTES
Acute non-recurrent pansinusitis  - cefdinir (OMNICEF) 300 MG capsule; Take 1 capsule (300 mg) by mouth 2 times daily for 10 days    Cefdinir to be used if symptoms go on for 10+ days.       General Tips for All Ages:    Rest and Hydration:  Allow yourself the time to rest and prioritize hydration.  Stay well-hydrated with water, clear broths, and soothing herbal teas.    Symptomatic Relief:  Over-the-counter (OTC) medications can help alleviate symptoms.  Consider non-pharmacological options like a warm saltwater gargle for soothing a sore throat.    For Infants and Children (Under 2 Years):    Nasal Saline Drops:  Use saline drops to clear nasal congestion in infants.  Administer 1-2 drops in each nostril before feeding or bedtime.    Humidifier:  Place a cool-mist humidifier in the room to ease congestion.  Remember to clean the humidifier regularly.  Okay to use [employer]     Acetaminophen (if applicable):  Use acetaminophen for fever and discomfort.  Follow dosing guidelines based on your child's weight.  Avoid aspirin in children to prevent Reye's syndrome.    Contact Pediatrician:  If symptoms persist or worsen, or if your child shows signs of respiratory distress, contact your pediatrician.    For Children (2-12 Years):    Nasal Saline Solution:  Encourage the use of saline nasal spray for congestion relief.  Teach your child to blow their nose gently.    Honey (for those over 1 year):  Use honey to soothe a cough (1-2 teaspoons as needed).  Do not give honey to children under 1 year due to the risk of botulism.    Acetaminophen or Ibuprofen:  Use acetaminophen or ibuprofen for fever and pain relief.  Follow dosing guidelines based on your child's weight.    Fluid Intake:  Ensure your child drinks warm liquids like herbal teas and broths.  Monitor their fluid intake to keep them hydrated.    For Adolescents and Adults (12 Years and Older):    Decongestants (Pseudoephedrine/Phenylephrine):  Consider OTC  decongestants for nasal congestion.  Use with caution if you have hypertension, and avoid prolonged use.    Cough Suppressants (Dextromethorphan):  Choose a cough suppressant for persistent cough.  Avoid in children under 12 years; use honey instead.  Follow package instructions and avoid multiple medications with similar ingredients.    Pain Relievers (Acetaminophen or Ibuprofen):  Use acetaminophen or ibuprofen for pain and fever.  Follow package instructions.  Take ibuprofen with food to minimize stomach upset.    Rest and Isolation:  Prioritize rest and stay at home to prevent spreading the infection.    For All Ages:    Hydration:  Ensure you stay well-hydrated; monitor urine color to gauge hydration.    Hand Hygiene:  Wash hands with soap and water for at least 20 seconds.  Use hand  with at least 60% alcohol if soap is unavailable.    Avoid Tobacco Smoke:  Stay away from tobacco smoke, which can worsen respiratory symptoms.    Seek Medical Attention:  If symptoms worsen or if you experience difficulty breathing, seek medical attention promptly.  Look out for red flag symptoms like persistent high fever, severe headache, or chest pain.    Patient advised to return to clinic for reevaluation (either UC or PCP) if symptoms do not improve in 7 days. Patient educated on red flag symptoms and asked to go directly to the ED if these symptoms present themselves.     Remember, this guide is meant to assist you, but individual cases may vary. If you have concerns or if symptoms persist, don't hesitate to reach out to a healthcare professional. Wishing you a speedy recovery!      PRICILA Yanez [facility]    Subjective   63 year old who presents to clinic today for the following health issues:    Urgent Care       HPI     Acute Illness  Acute illness concerns: Sick x 5 days, coughing, congestion, fever off and on.   Symptoms:  Fever: No fever currently   Chills/Sweats: No  Headache  (location?): YES  Sinus Pressure: YES  Conjunctivitis:  No  Ear Pain: YES: bilateral  Rhinorrhea: YES  Congestion: YES  Sore Throat: No  Cough: YES  Wheeze: YES and some shortness of breath   Decreased Appetite: No  Nausea: No  Vomiting: No  Diarrhea: No  Dysuria/Freq.: No  Dysuria or Hematuria: No  Fatigue/Achiness: YES  Sick/Strep Exposure: YES  Therapies tried and outcome: Z-jet     Review of Systems   Review of Systems   See HPI    Objective    Temp: 97.9  F (36.6  C) Temp src: Temporal BP: 120/62 Pulse: 55   Resp: 18 SpO2: 96 %       Physical Exam   Physical Exam  Constitutional:       General: He is not in acute distress.     Appearance: Normal appearance. He is normal weight. He is not ill-appearing, toxic-appearing or diaphoretic.   HENT:      Head: Normocephalic and atraumatic.      Right Ear: Tympanic membrane, ear canal and external ear normal. There is no impacted cerumen.      Left Ear: Tympanic membrane, ear canal and external ear normal. There is no impacted cerumen.      Nose: Congestion and rhinorrhea present.      Right Sinus: Maxillary sinus tenderness and frontal sinus tenderness present.      Left Sinus: Maxillary sinus tenderness and frontal sinus tenderness present.      Mouth/Throat:      Mouth: Mucous membranes are moist.      Pharynx: Oropharynx is clear. No oropharyngeal exudate or posterior oropharyngeal erythema.   Cardiovascular:      Rate and Rhythm: Normal rate and regular rhythm.      Pulses: Normal pulses.      Heart sounds: Normal heart sounds. No murmur heard.     No friction rub. No gallop.   Pulmonary:      Effort: Pulmonary effort is normal. No respiratory distress.      Breath sounds: Normal breath sounds. No stridor. No wheezing, rhonchi or rales.   Chest:      Chest wall: No tenderness.   Lymphadenopathy:      Cervical: No cervical adenopathy.   Neurological:      Mental Status: He is alert.   Psychiatric:         Mood and Affect: Mood normal.         Behavior: Behavior  normal.         Thought Content: Thought content normal.         Judgment: Judgment normal.          No results found for this or any previous visit (from the past 24 hour(s)).

## 2024-04-08 ENCOUNTER — ANCILLARY PROCEDURE (OUTPATIENT)
Dept: GENERAL RADIOLOGY | Facility: CLINIC | Age: 64
End: 2024-04-08
Attending: FAMILY MEDICINE
Payer: COMMERCIAL

## 2024-04-08 ENCOUNTER — OFFICE VISIT (OUTPATIENT)
Dept: FAMILY MEDICINE | Facility: CLINIC | Age: 64
End: 2024-04-08
Payer: COMMERCIAL

## 2024-04-08 VITALS
TEMPERATURE: 97.4 F | OXYGEN SATURATION: 97 % | RESPIRATION RATE: 14 BRPM | BODY MASS INDEX: 31.22 KG/M2 | SYSTOLIC BLOOD PRESSURE: 118 MMHG | WEIGHT: 206 LBS | HEIGHT: 68 IN | DIASTOLIC BLOOD PRESSURE: 70 MMHG | HEART RATE: 64 BPM

## 2024-04-08 DIAGNOSIS — M25.552 BILATERAL HIP PAIN: ICD-10-CM

## 2024-04-08 DIAGNOSIS — Z51.81 ENCOUNTER FOR THERAPEUTIC DRUG MONITORING: ICD-10-CM

## 2024-04-08 DIAGNOSIS — Z12.5 SCREENING FOR PROSTATE CANCER: ICD-10-CM

## 2024-04-08 DIAGNOSIS — E78.5 HYPERLIPIDEMIA WITH TARGET LDL LESS THAN 130: ICD-10-CM

## 2024-04-08 DIAGNOSIS — Z00.00 ROUTINE GENERAL MEDICAL EXAMINATION AT A HEALTH CARE FACILITY: Primary | ICD-10-CM

## 2024-04-08 DIAGNOSIS — M25.551 BILATERAL HIP PAIN: ICD-10-CM

## 2024-04-08 DIAGNOSIS — A60.00 GENITAL HERPES SIMPLEX, UNSPECIFIED SITE: ICD-10-CM

## 2024-04-08 DIAGNOSIS — R73.03 PREDIABETES: ICD-10-CM

## 2024-04-08 DIAGNOSIS — Z28.21 IMMUNIZATION DECLINED: ICD-10-CM

## 2024-04-08 LAB
ALBUMIN SERPL BCG-MCNC: 4.5 G/DL (ref 3.5–5.2)
ALP SERPL-CCNC: 88 U/L (ref 40–150)
ALT SERPL W P-5'-P-CCNC: 60 U/L (ref 0–70)
ANION GAP SERPL CALCULATED.3IONS-SCNC: 11 MMOL/L (ref 7–15)
AST SERPL W P-5'-P-CCNC: 31 U/L (ref 0–45)
BILIRUB SERPL-MCNC: 0.5 MG/DL
BUN SERPL-MCNC: 14.7 MG/DL (ref 8–23)
CALCIUM SERPL-MCNC: 9.2 MG/DL (ref 8.8–10.2)
CHLORIDE SERPL-SCNC: 102 MMOL/L (ref 98–107)
CHOLEST SERPL-MCNC: 176 MG/DL
CREAT SERPL-MCNC: 0.96 MG/DL (ref 0.67–1.17)
DEPRECATED HCO3 PLAS-SCNC: 26 MMOL/L (ref 22–29)
EGFRCR SERPLBLD CKD-EPI 2021: 89 ML/MIN/1.73M2
ERYTHROCYTE [DISTWIDTH] IN BLOOD BY AUTOMATED COUNT: 14 % (ref 10–15)
FASTING STATUS PATIENT QL REPORTED: YES
GLUCOSE SERPL-MCNC: 95 MG/DL (ref 70–99)
HBA1C MFR BLD: 5.9 % (ref 0–5.6)
HCT VFR BLD AUTO: 44.4 % (ref 40–53)
HDLC SERPL-MCNC: 53 MG/DL
HGB BLD-MCNC: 14.6 G/DL (ref 13.3–17.7)
LDLC SERPL CALC-MCNC: 77 MG/DL
MCH RBC QN AUTO: 27.8 PG (ref 26.5–33)
MCHC RBC AUTO-ENTMCNC: 32.9 G/DL (ref 31.5–36.5)
MCV RBC AUTO: 85 FL (ref 78–100)
NONHDLC SERPL-MCNC: 123 MG/DL
PLATELET # BLD AUTO: 270 10E3/UL (ref 150–450)
POTASSIUM SERPL-SCNC: 4.4 MMOL/L (ref 3.4–5.3)
PROT SERPL-MCNC: 7.3 G/DL (ref 6.4–8.3)
PSA SERPL DL<=0.01 NG/ML-MCNC: 0.72 NG/ML (ref 0–4.5)
RBC # BLD AUTO: 5.25 10E6/UL (ref 4.4–5.9)
SODIUM SERPL-SCNC: 139 MMOL/L (ref 135–145)
TRIGL SERPL-MCNC: 230 MG/DL
WBC # BLD AUTO: 5.8 10E3/UL (ref 4–11)

## 2024-04-08 PROCEDURE — 36415 COLL VENOUS BLD VENIPUNCTURE: CPT | Performed by: FAMILY MEDICINE

## 2024-04-08 PROCEDURE — 83036 HEMOGLOBIN GLYCOSYLATED A1C: CPT | Performed by: FAMILY MEDICINE

## 2024-04-08 PROCEDURE — 85027 COMPLETE CBC AUTOMATED: CPT | Performed by: FAMILY MEDICINE

## 2024-04-08 PROCEDURE — 73522 X-RAY EXAM HIPS BI 3-4 VIEWS: CPT | Mod: TC | Performed by: RADIOLOGY

## 2024-04-08 PROCEDURE — G0103 PSA SCREENING: HCPCS | Performed by: FAMILY MEDICINE

## 2024-04-08 PROCEDURE — 80053 COMPREHEN METABOLIC PANEL: CPT | Performed by: FAMILY MEDICINE

## 2024-04-08 PROCEDURE — 80061 LIPID PANEL: CPT | Performed by: FAMILY MEDICINE

## 2024-04-08 PROCEDURE — 99396 PREV VISIT EST AGE 40-64: CPT | Performed by: FAMILY MEDICINE

## 2024-04-08 PROCEDURE — 99214 OFFICE O/P EST MOD 30 MIN: CPT | Mod: 25 | Performed by: FAMILY MEDICINE

## 2024-04-08 RX ORDER — ATORVASTATIN CALCIUM 20 MG/1
20 TABLET, FILM COATED ORAL DAILY
Qty: 90 TABLET | Refills: 3 | Status: SHIPPED | OUTPATIENT
Start: 2024-04-08

## 2024-04-08 RX ORDER — VALACYCLOVIR HYDROCHLORIDE 1 G/1
1000 TABLET, FILM COATED ORAL DAILY
Qty: 90 TABLET | Refills: 3 | Status: SHIPPED | OUTPATIENT
Start: 2024-04-08

## 2024-04-08 ASSESSMENT — PAIN SCALES - GENERAL: PAINLEVEL: NO PAIN (1)

## 2024-04-08 NOTE — PROGRESS NOTES
"Preventive Care Visit  [facility]  Javadgarima Mara Dutta MD, Family Medicine  Apr 8, 2024      Assessment & Plan     Routine general medical examination at a health care facility  1/19/23 colonoscopy [facility] - 3 polyps were removed, repeat colonoscopy in 5 years   - followed by dermatologist     Hyperlipidemia with target LDL less than 130  - stable, refill below   - atorvastatin (LIPITOR) 20 MG tablet; Take 1 tablet (20 mg) by mouth daily  - Lipid panel reflex to direct LDL Fasting; Future  - Lipid panel reflex to direct LDL Fasting    Genital herpes simplex, unspecified site  - valACYclovir (VALTREX) 1000 mg tablet; Take 1 tablet (1,000 mg) by mouth daily    Prediabetes  - Hemoglobin A1c; Future  - Hemoglobin A1c    Bilateral hip pain  - likely due to OA  - ordered xray for further evaluation  - if xray does show arthritis he is interested in ortho referral     Screening for prostate cancer  - PSA, screen; Future  - PSA, screen    Encounter for therapeutic drug monitoring  - CBC with platelets; Future  - Comprehensive metabolic panel (BMP + Alb, Alk Phos, ALT, AST, Total. Bili, TP); Future  - CBC with platelets  - Comprehensive metabolic panel (BMP + Alb, Alk Phos, ALT, AST, Total. Bili, TP)    Immunization declined            BMI  Estimated body mass index is 31.32 kg/m  as calculated from the following:    Height as of this encounter: 1.727 m (5' 8\").    Weight as of this encounter: 93.4 kg (206 lb).             [place]   Ray is a 63 year old, presenting for the following:  Physical and Pain (In left hip mostly )        4/8/2024     8:16 AM   Additional Questions   Roomed by Michelle méndez        Health Care Directive  Patient does not have a Health Care Directive or Living Will: Discussed advance care planning with patient; information given to patient to review.    Healthy Habits:     Taking medications regularly:  0  Pain    History of Present Illness       Reason for visit:  Annual " physical    He eats 4 or more servings of fruits and vegetables daily.He consumes 0 sweetened beverage(s) daily.He exercises with enough effort to increase his heart rate 30 to 60 minutes per day.  He exercises with enough effort to increase his heart rate 4 days per week.   He is taking medications regularly.    Bilateral hip pain for 1-2 years. Pain is worse on the left hip. Pain is sporadic - right when he gets up or if sitting for 45-50 minutes. He also bicycles quite a bit. Pain is moderate, non radiating, if he gets up and moves it improves within 30-40 minutes and aggravated by prolonged sitting. No problems with exercising.         2023   Nutrition   Three or more servings of calcium each day? Yes   Diet: regular (no restrictions)         2023   Exercise   Frequency of exercise: 4-5 days/week            2023   Dental   Dentist two times every year? Yes            Today's PHQ-2 Score:       2024     7:45 PM   PHQ-2 (  [employer])   Q1: Little interest or pleasure in doing things 0   Q2: Feeling down, depressed or hopeless 0   PHQ-2 Score 0   Q1: Little interest or pleasure in doing things Not at all   Q2: Feeling down, depressed or hopeless Not at all   PHQ-2 Score 0           2023   Substance Use   Alcohol more than 3/day or more than 7/wk No     Social History     Tobacco Use    Smoking status: Former     Packs/day: 1.00     Years: 5.00     Additional pack years: 0.00     Total pack years: 5.00     Types: Cigarettes     Quit date: 1978     Years since quittin.2    Smokeless tobacco: Never   Substance Use Topics    Alcohol use: Yes     Alcohol/week: 0.0 standard drinks of alcohol     Comment: Socially- 2 drinks per week    Drug use: No       Last PSA:   PSA   Date Value Ref Range Status   10/31/2016 0.48 0 - 4 ug/L Final     Comment:     Assay Method:  Chemiluminescence using Siemens Vista analyzer     Prostate Specific Antigen Screen   Date Value Ref Range Status   2023  "0.77 0.00 - 4.50 ng/mL Final     ASCVD Risk   The 10-year ASCVD risk score (Derrick CARROLL, et al., 2019) is: 7.4%    Values used to calculate the score:      Age: 63 years      Sex: Male      Is Non- : No      Diabetic: No      Tobacco smoker: No      Systolic Blood Pressure: 118 mmHg      Is BP treated: No      HDL Cholesterol: 64 mg/dL      Total Cholesterol: 177 mg/dL           Reviewed and updated as needed this visit by Provider                             Objective    Exam  /70 (BP Location: Right arm, Patient Position: Sitting, Cuff Size: Adult Regular)   Pulse 64   Temp 97.4  F (36.3  C) (Temporal)   Resp 14   Ht 1.727 m (5' 8\")   Wt 93.4 kg (206 lb)   SpO2 97%   BMI 31.32 kg/m     Estimated body mass index is 31.32 kg/m  as calculated from the following:    Height as of this encounter: 1.727 m (5' 8\").    Weight as of this encounter: 93.4 kg (206 lb).    Physical Exam          Signed Electronically by: Dana Dutta MD    "

## 2024-04-08 NOTE — PATIENT INSTRUCTIONS
Preventive Care Advice   This is general advice given by our system to help you stay healthy. However, your care team may have specific advice just for you. Please talk to your care team about your preventive care needs.  Nutrition  Eat 5 or more servings of fruits and vegetables each day.  Try wheat bread, brown rice and whole grain pasta (instead of white bread, rice, and pasta).  Get enough calcium and vitamin D. Check the label on foods and aim for 100% of the RDA (recommended daily allowance).  Lifestyle  Exercise at least 150 minutes each week   (30 minutes a day, 5 days a week).  Do muscle strengthening activities 2 days a week. These help control your weight and prevent disease.  No smoking.  Wear sunscreen to prevent skin cancer.  Have a dental exam and cleaning every 6 months.  Yearly exams  See your health care team every year to talk about:  Any changes in your health.  Any medicines your care team has prescribed.  Preventive care, family planning, and ways to prevent chronic diseases.  Shots (vaccines)   HPV shots (up to age 26), if you've never had them before.  Hepatitis B shots (up to age 59), if you've never had them before.  COVID-19 shot: Get this shot when it's due.  Flu shot: Get a flu shot every year.  Tetanus shot: Get a tetanus shot every 10 years.  Pneumococcal, hepatitis A, and RSV shots: Ask your care team if you need these based on your risk.  Shingles shot (for age 50 and up).  General health tests  Diabetes screening:  Starting at age 35, Get screened for diabetes at least every 3 years.  If you are younger than age 35, ask your care team if you should be screened for diabetes.  Cholesterol test: At age 39, start having a cholesterol test every 5 years, or more often if advised.  Bone density scan (DEXA): At age 50, ask your care team if you should have this scan for osteoporosis (brittle bones).  Hepatitis C: Get tested at least once in your life.  STIs (sexually transmitted  infections)  Before age 24: Ask your care team if you should be screened for STIs.  After age 24: Get screened for STIs if you're at risk. You are at risk for STIs (including HIV) if:  You are sexually active with more than one person.  You don't use condoms every time.  You or a partner was diagnosed with a sexually transmitted infection.  If you are at risk for HIV, ask about PrEP medicine to prevent HIV.  Get tested for HIV at least once in your life, whether you are at risk for HIV or not.  Cancer screening tests  Cervical cancer screening: If you have a cervix, begin getting regular cervical cancer screening tests at age 21. Most people who have regular screenings with normal results can stop after age 65. Talk about this with your provider.  Breast cancer scan (mammogram): If you've ever had breasts, begin having regular mammograms starting at age 40. This is a scan to check for breast cancer.  Colon cancer screening: It is important to start screening for colon cancer at age 45.  Have a colonoscopy test every 10 years (or more often if you're at risk) Or, ask your provider about stool tests like a FIT test every year or Cologuard test every 3 years.  To learn more about your testing options, visit: https://www.Navmii/747482.pdf.  For help making a decision, visit: https://bit.ly/aq33196.  Prostate cancer screening test: If you have a prostate and are age 55 to 69, ask your provider if you would benefit from a yearly prostate cancer screening test.  Lung cancer screening: If you are a current or former smoker age 50 to 80, ask your care team if ongoing lung cancer screenings are right for you.  For informational purposes only. Not to replace the advice of your health care provider. Copyright   2023 [employer]. All rights reserved. Clinically reviewed by the [facility]. [employer] [id number] - REV 01/24.

## 2024-04-09 ENCOUNTER — MYC MEDICAL ADVICE (OUTPATIENT)
Dept: FAMILY MEDICINE | Facility: CLINIC | Age: 64
End: 2024-04-09
Payer: COMMERCIAL

## 2024-04-22 ENCOUNTER — E-VISIT (OUTPATIENT)
Dept: FAMILY MEDICINE | Facility: CLINIC | Age: 64
End: 2024-04-22
Payer: COMMERCIAL

## 2024-04-22 DIAGNOSIS — R19.09 UMBILICAL SWELLING: Primary | ICD-10-CM

## 2024-04-22 PROCEDURE — 99207 PR NON-BILLABLE SERV PER CHARTING: CPT | Performed by: FAMILY MEDICINE

## 2024-04-23 ENCOUNTER — MYC MEDICAL ADVICE (OUTPATIENT)
Dept: FAMILY MEDICINE | Facility: CLINIC | Age: 64
End: 2024-04-23
Payer: COMMERCIAL

## 2024-04-24 NOTE — TELEPHONE ENCOUNTER
Writer responded via [employer].  MARTIN MccallumN, RN-BC  [facility]

## 2024-04-25 NOTE — TELEPHONE ENCOUNTER
"Writer spoke with patient regarding MyChart message over concerns of \"Swollen belly button.\" Patient did shared that he went into Urgent Care yesterday.  No documentation seen in patient's chart. Per patient, he was diagnosed with an Umbilical Hernia.    Patient does not need anything further at this time.    Closing encounter.    MARTIN EstradaN, RN   [facility]      "

## 2025-01-02 ENCOUNTER — HOSPITAL ENCOUNTER (EMERGENCY)
Facility: CLINIC | Age: 65
Discharge: HOME | End: 2025-01-02
Attending: EMERGENCY MEDICINE
Payer: COMMERCIAL

## 2025-01-02 VITALS
RESPIRATION RATE: 16 BRPM | SYSTOLIC BLOOD PRESSURE: 145 MMHG | DIASTOLIC BLOOD PRESSURE: 80 MMHG | BODY MASS INDEX: 30.31 KG/M2 | HEIGHT: 68 IN | HEART RATE: 52 BPM | OXYGEN SATURATION: 99 % | TEMPERATURE: 98 F | WEIGHT: 200 LBS

## 2025-01-02 DIAGNOSIS — D49.0 TONSIL NEOPLASM: ICD-10-CM

## 2025-01-02 LAB
ALBUMIN SERPL BCG-MCNC: 4.5 G/DL (ref 3.5–5.2)
ALP SERPL-CCNC: 110 U/L (ref 40–150)
ALT SERPL W P-5'-P-CCNC: 50 U/L (ref 0–70)
ANION GAP SERPL CALCULATED.3IONS-SCNC: 11 MMOL/L (ref 7–15)
AST SERPL W P-5'-P-CCNC: 26 U/L (ref 0–45)
BASOPHILS # BLD AUTO: 0.1 10E3/UL (ref 0–0.2)
BASOPHILS NFR BLD AUTO: 1 %
BILIRUB SERPL-MCNC: 0.4 MG/DL
BUN SERPL-MCNC: 11.7 MG/DL (ref 8–23)
CALCIUM SERPL-MCNC: 9.3 MG/DL (ref 8.8–10.4)
CHLORIDE SERPL-SCNC: 100 MMOL/L (ref 98–107)
CREAT SERPL-MCNC: 1.08 MG/DL (ref 0.67–1.17)
EGFRCR SERPLBLD CKD-EPI 2021: 77 ML/MIN/1.73M2
EOSINOPHIL # BLD AUTO: 0.1 10E3/UL (ref 0–0.7)
EOSINOPHIL NFR BLD AUTO: 2 %
ERYTHROCYTE [DISTWIDTH] IN BLOOD BY AUTOMATED COUNT: 14.2 % (ref 10–15)
GLUCOSE SERPL-MCNC: 97 MG/DL (ref 70–99)
HCO3 SERPL-SCNC: 28 MMOL/L (ref 22–29)
HCT VFR BLD AUTO: 47.7 % (ref 40–53)
HGB BLD-MCNC: 15.5 G/DL (ref 13.3–17.7)
HOLD SPECIMEN: 0
IMM GRANULOCYTES # BLD: 0 10E3/UL
IMM GRANULOCYTES NFR BLD: 0 %
LYMPHOCYTES # BLD AUTO: 2 10E3/UL (ref 0.8–5.3)
LYMPHOCYTES NFR BLD AUTO: 27 %
MCH RBC QN AUTO: 27.7 PG (ref 26.5–33)
MCHC RBC AUTO-ENTMCNC: 32.5 G/DL (ref 31.5–36.5)
MCV RBC AUTO: 85 FL (ref 78–100)
MONOCYTES # BLD AUTO: 0.5 10E3/UL (ref 0–1.3)
MONOCYTES NFR BLD AUTO: 7 %
NEUTROPHILS # BLD AUTO: 4.8 10E3/UL (ref 1.6–8.3)
NEUTROPHILS NFR BLD AUTO: 63 %
NRBC # BLD AUTO: 0 10E3/UL
NRBC BLD AUTO-RTO: 0 /100
PLATELET # BLD AUTO: 241 10E3/UL (ref 150–450)
POTASSIUM SERPL-SCNC: 4 MMOL/L (ref 3.4–5.3)
PROT SERPL-MCNC: 7.6 G/DL (ref 6.4–8.3)
RBC # BLD AUTO: 5.59 10E6/UL (ref 4.4–5.9)
SODIUM SERPL-SCNC: 139 MMOL/L (ref 135–145)
WBC # BLD AUTO: 7.5 10E3/UL (ref 4–11)

## 2025-01-02 PROCEDURE — 80051 ELECTROLYTE PANEL: CPT | Performed by: EMERGENCY MEDICINE

## 2025-01-02 PROCEDURE — 85025 COMPLETE CBC W/AUTO DIFF WBC: CPT | Performed by: EMERGENCY MEDICINE

## 2025-01-02 PROCEDURE — 84155 ASSAY OF PROTEIN SERUM: CPT | Performed by: EMERGENCY MEDICINE

## 2025-01-02 PROCEDURE — 85014 HEMATOCRIT: CPT | Performed by: EMERGENCY MEDICINE

## 2025-01-02 PROCEDURE — 99283 EMERGENCY DEPT VISIT LOW MDM: CPT

## 2025-01-02 PROCEDURE — 80053 COMPREHEN METABOLIC PANEL: CPT | Performed by: EMERGENCY MEDICINE

## 2025-01-02 PROCEDURE — 36415 COLL VENOUS BLD VENIPUNCTURE: CPT | Performed by: EMERGENCY MEDICINE

## 2025-01-02 ASSESSMENT — ACTIVITIES OF DAILY LIVING (ADL)
ADLS_ACUITY_SCORE: 41
ADLS_ACUITY_SCORE: 41

## 2025-01-02 NOTE — DISCHARGE INSTRUCTIONS
Your CT scan has shown a mass on the tonsil we are not sure what this is but needs workup from an ENT.  Please follow-up with the ENT they have is told me that they will reach out and schedule an appointment with you on Tuesday.  Over the weekend if swelling or pain becomes dramatically worse return to the emergency room for reassessment.  Thanks for your patience today.

## 2025-01-02 NOTE — ED PROVIDER NOTES
"  Emergency Department Note      History of Present Illness     Chief Complaint   Mass    HPI   Jj Stallworth is a 64 year old male who presents to the ED with his wife for evaluation of a neck mass. Ray reports that he noticed a lump on the right side of his neck yesterday. He went to  today where they took a CT scan and prompted Ray to come into the ED. No difficulty or pain with swallowing.    Independent Historian   None    Review of External Notes       Past Medical History     Medical History and Problem List   Chronic seasonal allergic rhinitis   Degenerative disc disease   Diverticulosis of large intestine   Genital herpes  Hyperlipidemia   Hyperplastic colon polyp   Obesity   Prediabetes   Umbilical hernia     Medications   Lipitor   Valtrex     Surgical History   Past Surgical History:   Procedure Laterality Date    COLONOSCOPY N/A 4/19/2018    Procedure: COMBINED COLONOSCOPY, SINGLE OR MULTIPLE BIOPSY/POLYPECTOMY BY BIOPSY;  COLONOSCOPY;  Surgeon: Dilip Santos MD;  Location:  GI    HEMORRHOIDECTOMY  2003    rubber band ligation    [facility] NONSPECIFIC PROCEDURE  2002    fatty tumor removed from posterior neck  - done at [facility] in [facility] NONSPECIFIC PROCEDURE  July 2003    treamill stress myocardial perfusion scan - done in [place], [place] - negative for ischemia or infarct.     Physical Exam     Patient Vitals for the past 24 hrs:   BP Temp Temp src Pulse Resp SpO2 Height Weight   01/02/25 1150 -- -- -- -- -- -- 1.727 m (5' 8\") 90.7 kg (200 lb)   01/02/25 1149 (!) 145/80 98  F (36.7  C) Temporal 52 16 99 % -- --     Physical Exam  Vitals reviewed.   HENT:      Head: Normocephalic.      Mouth/Throat:      Mouth: Mucous membranes are moist.      Pharynx: Oropharynx is clear. No oropharyngeal exudate or posterior oropharyngeal erythema.   Neck:      Comments: There is slight swelling in the right anterior cervical triangle.  There is tenderness with palpation no " crepitus.  Cardiovascular:      Rate and Rhythm: Normal rate.   Pulmonary:      Effort: Pulmonary effort is normal.   Abdominal:      General: Abdomen is flat.   Musculoskeletal:      Cervical back: Normal range of motion.   Lymphadenopathy:      Cervical: Cervical adenopathy present.   Neurological:      Mental Status: He is alert.         Diagnostics     Lab Results   Labs Ordered and Resulted from Time of ED Arrival to Time of ED Departure   COMPREHENSIVE METABOLIC PANEL - Normal       Result Value    Sodium 139      Potassium 4.0      Carbon Dioxide (CO2) 28      Anion Gap 11      Urea Nitrogen 11.7      Creatinine 1.08      GFR Estimate 77      Calcium 9.3      Chloride 100      Glucose 97      Alkaline Phosphatase 110      AST 26      ALT 50      Protein Total 7.6      Albumin 4.5      Bilirubin Total 0.4     CBC WITH PLATELETS AND DIFFERENTIAL    WBC Count 7.5      RBC Count 5.59      Hemoglobin 15.5      Hematocrit 47.7      MCV 85      MCH 27.7      MCHC 32.5      RDW 14.2      Platelet Count 241      % Neutrophils 63      % Lymphocytes 27      % Monocytes 7      % Eosinophils 2      % Basophils 1      % Immature Granulocytes 0      NRBCs per 100 WBC 0      Absolute Neutrophils 4.8      Absolute Lymphocytes 2.0      Absolute Monocytes 0.5      Absolute Eosinophils 0.1      Absolute Basophils 0.1      Absolute Immature Granulocytes 0.0      Absolute NRBCs 0.0       Imaging   No orders to display     Independent Interpretation   None    ED Course      Medications Administered   Medications - No data to display    Procedures   Procedures     Discussion of Management   None    ED Course   ED Course as of 01/02/25 1339   Thu Jan 02, 2025   1257 I obtained history and examined the patient as noted above.    1320 I spoke to ENT regarding the patient's CT results and presentation to the ED.     Additional Documentation  None    Medical Decision Making / Diagnosis     CMS Diagnoses: None    MIPS       None    MDM    Jj Stallworth is a 64 year old male who presents with an abnormal CAT scan by urgent care.  Patient developed pain and swelling in the right neck.  CT performed as an outpatient this identified some neoplasm in the right glossal pharyngeal fold as well as a necrotic lymph node on the right neck.  Patient on arrival has no stridor no difficulty breathing no severe pain.  Suspect patient will require an ENT consultation but not emergently.  Care was discussed with on-call ENT and was more than willing to see the patient graciously on Tuesday.  Patient was discharged home to follow-up as an outpatient.    Disposition   The patient was discharged.     Diagnosis     ICD-10-CM    1. Tonsil neoplasm  D49.0          Discharge Medications   New Prescriptions    No medications on file     I, Kathleen Gentile, am serving as a scribe at 12:57 PM on 1/2/2025 to document services personally performed by Neil Armenta MD based on my observations and the provider's statements to me.        Neil Armenta MD  01/02/25 4669

## 2025-01-07 ENCOUNTER — TRANSFERRED RECORDS (OUTPATIENT)
Dept: HEALTH INFORMATION MANAGEMENT | Facility: CLINIC | Age: 65
End: 2025-01-07
Payer: COMMERCIAL

## 2025-01-14 DIAGNOSIS — R22.1 NECK MASS: ICD-10-CM

## 2025-01-14 NOTE — CONSULTS
Outpatient Neuroradiology Biopsy Referral     Patient is a 65 y/o male with a PMH of DDD, HDL, obesity, new neck swelling, notes right neck mass, UC then ED for evaluation 1/2/25, referred to ENT, now requesting biopsy. Neuroradiology has been asked to biopsy mass on right neck. No images in [facility] PACS. Images requested. Images now in pacs.       CT OSH 1/2/25 [facility]          MUCOSAL SPACES/SOFT TISSUES: Ill-defined asymmetric soft tissue thickening in the right tonsillar fossa/glossotonsillar sulcus (series 2 image #74) with a centrally necrotic right level IIA cervical chain lymph node measuring 1.9 x 1.9 x 3.1 cm     Plan for Case and imaging review with Dr. Lopes and Dr. Bennett from Neuroradiology and US FNA biopsy of the right neck mass is approved. Series 2 Image 63.    Patient not on AC at time of referral.     Procedure order, FNA order placed.    If requesting team would like samples sent for anything else please enter them or notify Neuroradiology prior to scheduled procedure.    Primary team Dr. Mendoza Encarnacion Faxed referral from ENT specialty care. Laplace made aware of Neuroradiology recommendations via epic messaging.    TONG Wing CNP  Interventional Radiology   IR on-call pager: [phone]

## 2025-01-20 ENCOUNTER — HOSPITAL ENCOUNTER (OUTPATIENT)
Dept: ULTRASOUND IMAGING | Facility: CLINIC | Age: 65
Discharge: HOME | End: 2025-01-20
Attending: STUDENT IN AN ORGANIZED HEALTH CARE EDUCATION/TRAINING PROGRAM | Admitting: STUDENT IN AN ORGANIZED HEALTH CARE EDUCATION/TRAINING PROGRAM
Payer: COMMERCIAL

## 2025-01-20 DIAGNOSIS — R22.1 NECK MASS: ICD-10-CM

## 2025-01-20 PROCEDURE — 88341 IMHCHEM/IMCYTCHM EA ADD ANTB: CPT | Mod: TC | Performed by: STUDENT IN AN ORGANIZED HEALTH CARE EDUCATION/TRAINING PROGRAM

## 2025-01-20 PROCEDURE — 250N000009 HC RX 250: Performed by: RADIOLOGY

## 2025-01-20 PROCEDURE — 76942 ECHO GUIDE FOR BIOPSY: CPT

## 2025-01-20 PROCEDURE — 272N000431 US BIOPSY CORE LYMPH NODE

## 2025-01-20 PROCEDURE — 88342 IMHCHEM/IMCYTCHM 1ST ANTB: CPT | Mod: TC | Performed by: STUDENT IN AN ORGANIZED HEALTH CARE EDUCATION/TRAINING PROGRAM

## 2025-01-20 PROCEDURE — 96372 THER/PROPH/DIAG INJ SC/IM: CPT | Performed by: RADIOLOGY

## 2025-01-20 RX ORDER — LIDOCAINE HYDROCHLORIDE 10 MG/ML
10 INJECTION, SOLUTION EPIDURAL; INFILTRATION; INTRACAUDAL; PERINEURAL ONCE
Status: COMPLETED | OUTPATIENT
Start: 2025-01-20 | End: 2025-01-20

## 2025-01-20 RX ADMIN — LIDOCAINE HYDROCHLORIDE 10 ML: 10 INJECTION, SOLUTION EPIDURAL; INFILTRATION; INTRACAUDAL; PERINEURAL at 10:31

## 2025-01-22 LAB
PATH REPORT.COMMENTS IMP SPEC: ABNORMAL
PATH REPORT.COMMENTS IMP SPEC: YES
PATH REPORT.FINAL DX SPEC: ABNORMAL
PATH REPORT.GROSS SPEC: ABNORMAL
PATH REPORT.MICROSCOPIC SPEC OTHER STN: ABNORMAL
PATH REPORT.MICROSCOPIC SPEC OTHER STN: ABNORMAL
PATH REPORT.RELEVANT HX SPEC: ABNORMAL
PHOTO IMAGE: ABNORMAL

## 2025-01-28 ENCOUNTER — HOSPITAL ENCOUNTER (OUTPATIENT)
Facility: CLINIC | Age: 65
End: 2025-01-28
Attending: STUDENT IN AN ORGANIZED HEALTH CARE EDUCATION/TRAINING PROGRAM | Admitting: STUDENT IN AN ORGANIZED HEALTH CARE EDUCATION/TRAINING PROGRAM
Payer: COMMERCIAL

## 2025-01-29 ENCOUNTER — HOSPITAL ENCOUNTER (OUTPATIENT)
Facility: CLINIC | Age: 65
Setting detail: OBSERVATION
Discharge: HOME | End: 2025-01-30
Attending: EMERGENCY MEDICINE | Admitting: INTERNAL MEDICINE
Payer: COMMERCIAL

## 2025-01-29 ENCOUNTER — LAB REQUISITION (OUTPATIENT)
Dept: LAB | Facility: CLINIC | Age: 65
End: 2025-01-29
Payer: COMMERCIAL

## 2025-01-29 DIAGNOSIS — N36.8 URETHRAL BLEEDING: ICD-10-CM

## 2025-01-29 DIAGNOSIS — R22.1 LOCALIZED SWELLING, MASS AND LUMP, NECK: ICD-10-CM

## 2025-01-29 DIAGNOSIS — R33.8 ACUTE URINARY RETENTION: Primary | ICD-10-CM

## 2025-01-29 DIAGNOSIS — C44.92 SQUAMOUS CELL CARCINOMA OF SKIN, UNSPECIFIED: ICD-10-CM

## 2025-01-29 DIAGNOSIS — K59.00 CONSTIPATION, UNSPECIFIED CONSTIPATION TYPE: ICD-10-CM

## 2025-01-29 PROCEDURE — 88341 IMHCHEM/IMCYTCHM EA ADD ANTB: CPT | Mod: TC,ORL | Performed by: STUDENT IN AN ORGANIZED HEALTH CARE EDUCATION/TRAINING PROGRAM

## 2025-01-29 PROCEDURE — 88305 TISSUE EXAM BY PATHOLOGIST: CPT | Mod: 26 | Performed by: PATHOLOGY

## 2025-01-29 PROCEDURE — 250N000013 HC RX MED GY IP 250 OP 250 PS 637: Performed by: EMERGENCY MEDICINE

## 2025-01-29 PROCEDURE — 99285 EMERGENCY DEPT VISIT HI MDM: CPT | Mod: 25

## 2025-01-29 PROCEDURE — 88342 IMHCHEM/IMCYTCHM 1ST ANTB: CPT | Mod: 26 | Performed by: PATHOLOGY

## 2025-01-29 PROCEDURE — 51798 US URINE CAPACITY MEASURE: CPT

## 2025-01-29 PROCEDURE — 81001 URINALYSIS AUTO W/SCOPE: CPT | Performed by: EMERGENCY MEDICINE

## 2025-01-29 PROCEDURE — 51702 INSERT TEMP BLADDER CATH: CPT

## 2025-01-29 PROCEDURE — 88341 IMHCHEM/IMCYTCHM EA ADD ANTB: CPT | Mod: 26 | Performed by: PATHOLOGY

## 2025-01-29 PROCEDURE — 250N000009 HC RX 250: Performed by: EMERGENCY MEDICINE

## 2025-01-29 RX ORDER — ACETAMINOPHEN 325 MG/1
650 TABLET ORAL ONCE
Status: COMPLETED | OUTPATIENT
Start: 2025-01-29 | End: 2025-01-29

## 2025-01-29 RX ORDER — LIDOCAINE HYDROCHLORIDE 20 MG/ML
JELLY TOPICAL
Status: DISCONTINUED | OUTPATIENT
Start: 2025-01-29 | End: 2025-01-29

## 2025-01-29 RX ORDER — LIDOCAINE HYDROCHLORIDE 20 MG/ML
JELLY TOPICAL
Status: COMPLETED | OUTPATIENT
Start: 2025-01-29 | End: 2025-01-29

## 2025-01-29 RX ORDER — LIDOCAINE HYDROCHLORIDE 20 MG/ML
JELLY TOPICAL
Status: COMPLETED | OUTPATIENT
Start: 2025-01-29 | End: 2025-01-30

## 2025-01-29 RX ADMIN — LIDOCAINE HYDROCHLORIDE: 20 JELLY TOPICAL at 20:32

## 2025-01-29 RX ADMIN — ACETAMINOPHEN 650 MG: 325 TABLET, FILM COATED ORAL at 21:13

## 2025-01-29 ASSESSMENT — ACTIVITIES OF DAILY LIVING (ADL)
ADLS_ACUITY_SCORE: 41

## 2025-01-29 ASSESSMENT — COLUMBIA-SUICIDE SEVERITY RATING SCALE - C-SSRS
1. IN THE PAST MONTH, HAVE YOU WISHED YOU WERE DEAD OR WISHED YOU COULD GO TO SLEEP AND NOT WAKE UP?: NO
2. HAVE YOU ACTUALLY HAD ANY THOUGHTS OF KILLING YOURSELF IN THE PAST MONTH?: NO
6. HAVE YOU EVER DONE ANYTHING, STARTED TO DO ANYTHING, OR PREPARED TO DO ANYTHING TO END YOUR LIFE?: NO

## 2025-01-30 VITALS
SYSTOLIC BLOOD PRESSURE: 117 MMHG | TEMPERATURE: 98.3 F | RESPIRATION RATE: 16 BRPM | HEART RATE: 57 BPM | OXYGEN SATURATION: 94 % | HEIGHT: 68 IN | BODY MASS INDEX: 29.34 KG/M2 | DIASTOLIC BLOOD PRESSURE: 61 MMHG | WEIGHT: 193.6 LBS

## 2025-01-30 PROBLEM — N36.8 URETHRAL BLEEDING: Status: ACTIVE | Noted: 2025-01-30

## 2025-01-30 PROBLEM — R33.8 ACUTE URINARY RETENTION: Status: ACTIVE | Noted: 2025-01-30

## 2025-01-30 LAB
ALBUMIN UR-MCNC: NEGATIVE MG/DL
ANION GAP SERPL CALCULATED.3IONS-SCNC: 15 MMOL/L (ref 7–15)
APPEARANCE UR: CLEAR
BASOPHILS # BLD AUTO: 0 10E3/UL (ref 0–0.2)
BASOPHILS NFR BLD AUTO: 0 %
BILIRUB UR QL STRIP: NEGATIVE
BUN SERPL-MCNC: 14.2 MG/DL (ref 8–23)
CALCIUM SERPL-MCNC: 9.2 MG/DL (ref 8.8–10.4)
CHLORIDE SERPL-SCNC: 99 MMOL/L (ref 98–107)
COLOR UR AUTO: ABNORMAL
CREAT SERPL-MCNC: 0.89 MG/DL (ref 0.67–1.17)
EGFRCR SERPLBLD CKD-EPI 2021: >90 ML/MIN/1.73M2
EOSINOPHIL # BLD AUTO: 0 10E3/UL (ref 0–0.7)
EOSINOPHIL NFR BLD AUTO: 0 %
ERYTHROCYTE [DISTWIDTH] IN BLOOD BY AUTOMATED COUNT: 13.9 % (ref 10–15)
GLUCOSE SERPL-MCNC: 127 MG/DL (ref 70–99)
GLUCOSE UR STRIP-MCNC: 70 MG/DL
HCO3 SERPL-SCNC: 21 MMOL/L (ref 22–29)
HCT VFR BLD AUTO: 40 % (ref 40–53)
HGB BLD-MCNC: 13.5 G/DL (ref 13.3–17.7)
HGB UR QL STRIP: NEGATIVE
IMM GRANULOCYTES # BLD: 0.1 10E3/UL
IMM GRANULOCYTES NFR BLD: 1 %
KETONES UR STRIP-MCNC: ABNORMAL MG/DL
LEUKOCYTE ESTERASE UR QL STRIP: NEGATIVE
LYMPHOCYTES # BLD AUTO: 1.3 10E3/UL (ref 0.8–5.3)
LYMPHOCYTES NFR BLD AUTO: 10 %
MCH RBC QN AUTO: 28 PG (ref 26.5–33)
MCHC RBC AUTO-ENTMCNC: 33.8 G/DL (ref 31.5–36.5)
MCV RBC AUTO: 83 FL (ref 78–100)
MONOCYTES # BLD AUTO: 0.8 10E3/UL (ref 0–1.3)
MONOCYTES NFR BLD AUTO: 6 %
MUCOUS THREADS #/AREA URNS LPF: PRESENT /LPF
NEUTROPHILS # BLD AUTO: 10 10E3/UL (ref 1.6–8.3)
NEUTROPHILS NFR BLD AUTO: 83 %
NITRATE UR QL: NEGATIVE
NRBC # BLD AUTO: 0 10E3/UL
NRBC BLD AUTO-RTO: 0 /100
PH UR STRIP: 5.5 [PH] (ref 5–7)
PLATELET # BLD AUTO: 231 10E3/UL (ref 150–450)
POTASSIUM SERPL-SCNC: 4.2 MMOL/L (ref 3.4–5.3)
RBC # BLD AUTO: 4.83 10E6/UL (ref 4.4–5.9)
RBC URINE: 9 /HPF
SODIUM SERPL-SCNC: 135 MMOL/L (ref 135–145)
SP GR UR STRIP: 1.03 (ref 1–1.03)
UROBILINOGEN UR STRIP-MCNC: NORMAL MG/DL
WBC # BLD AUTO: 12.2 10E3/UL (ref 4–11)
WBC URINE: 1 /HPF

## 2025-01-30 PROCEDURE — 96365 THER/PROPH/DIAG IV INF INIT: CPT

## 2025-01-30 PROCEDURE — G0378 HOSPITAL OBSERVATION PER HR: HCPCS

## 2025-01-30 PROCEDURE — 250N000009 HC RX 250: Performed by: EMERGENCY MEDICINE

## 2025-01-30 PROCEDURE — 82435 ASSAY OF BLOOD CHLORIDE: CPT | Performed by: EMERGENCY MEDICINE

## 2025-01-30 PROCEDURE — 85025 COMPLETE CBC W/AUTO DIFF WBC: CPT | Performed by: EMERGENCY MEDICINE

## 2025-01-30 PROCEDURE — 82565 ASSAY OF CREATININE: CPT | Performed by: EMERGENCY MEDICINE

## 2025-01-30 PROCEDURE — 250N000013 HC RX MED GY IP 250 OP 250 PS 637: Performed by: INTERNAL MEDICINE

## 2025-01-30 PROCEDURE — 99207 PR NO BILLABLE SERVICE THIS VISIT: CPT | Performed by: NURSE PRACTITIONER

## 2025-01-30 PROCEDURE — 99207 PR NO BILLABLE SERVICE THIS VISIT: CPT | Performed by: HOSPITALIST

## 2025-01-30 PROCEDURE — 80048 BASIC METABOLIC PNL TOTAL CA: CPT | Performed by: EMERGENCY MEDICINE

## 2025-01-30 PROCEDURE — 99236 HOSP IP/OBS SAME DATE HI 85: CPT | Performed by: INTERNAL MEDICINE

## 2025-01-30 PROCEDURE — 36415 COLL VENOUS BLD VENIPUNCTURE: CPT | Performed by: EMERGENCY MEDICINE

## 2025-01-30 PROCEDURE — 250N000011 HC RX IP 250 OP 636: Performed by: EMERGENCY MEDICINE

## 2025-01-30 PROCEDURE — 96375 TX/PRO/DX INJ NEW DRUG ADDON: CPT

## 2025-01-30 RX ORDER — TAMSULOSIN HYDROCHLORIDE 0.4 MG/1
0.4 CAPSULE ORAL AT BEDTIME
Qty: 10 CAPSULE | Refills: 0 | Status: SHIPPED | OUTPATIENT
Start: 2025-01-30

## 2025-01-30 RX ORDER — AMOXICILLIN 250 MG
1 CAPSULE ORAL 2 TIMES DAILY PRN
Qty: 20 TABLET | Refills: 0 | Status: SHIPPED | OUTPATIENT
Start: 2025-01-30

## 2025-01-30 RX ORDER — POLYETHYLENE GLYCOL 3350 17 G/17G
17 POWDER, FOR SOLUTION ORAL 2 TIMES DAILY PRN
Status: DISCONTINUED | OUTPATIENT
Start: 2025-01-30 | End: 2025-01-30 | Stop reason: HOSPADM

## 2025-01-30 RX ORDER — AMOXICILLIN 250 MG
1 CAPSULE ORAL 2 TIMES DAILY PRN
Status: DISCONTINUED | OUTPATIENT
Start: 2025-01-30 | End: 2025-01-30 | Stop reason: HOSPADM

## 2025-01-30 RX ORDER — ONDANSETRON 2 MG/ML
4 INJECTION INTRAMUSCULAR; INTRAVENOUS EVERY 6 HOURS PRN
Status: DISCONTINUED | OUTPATIENT
Start: 2025-01-30 | End: 2025-01-30 | Stop reason: HOSPADM

## 2025-01-30 RX ORDER — NALOXONE HYDROCHLORIDE 0.4 MG/ML
0.4 INJECTION, SOLUTION INTRAMUSCULAR; INTRAVENOUS; SUBCUTANEOUS
Status: DISCONTINUED | OUTPATIENT
Start: 2025-01-30 | End: 2025-01-30 | Stop reason: HOSPADM

## 2025-01-30 RX ORDER — HYDROMORPHONE HYDROCHLORIDE 1 MG/ML
0.5 INJECTION, SOLUTION INTRAMUSCULAR; INTRAVENOUS; SUBCUTANEOUS ONCE
Status: COMPLETED | OUTPATIENT
Start: 2025-01-30 | End: 2025-01-30

## 2025-01-30 RX ORDER — ACETAMINOPHEN 650 MG/1
650 SUPPOSITORY RECTAL EVERY 4 HOURS PRN
Status: DISCONTINUED | OUTPATIENT
Start: 2025-01-30 | End: 2025-01-30 | Stop reason: HOSPADM

## 2025-01-30 RX ORDER — OXYCODONE HYDROCHLORIDE 5 MG/1
5 TABLET ORAL EVERY 4 HOURS PRN
Status: DISCONTINUED | OUTPATIENT
Start: 2025-01-30 | End: 2025-01-30 | Stop reason: HOSPADM

## 2025-01-30 RX ORDER — MULTIVITAMIN,THERAPEUTIC
1 TABLET ORAL DAILY
COMMUNITY

## 2025-01-30 RX ORDER — ONDANSETRON 4 MG/1
4 TABLET, ORALLY DISINTEGRATING ORAL EVERY 6 HOURS PRN
Status: DISCONTINUED | OUTPATIENT
Start: 2025-01-30 | End: 2025-01-30 | Stop reason: HOSPADM

## 2025-01-30 RX ORDER — ACETAMINOPHEN 325 MG/1
650 TABLET ORAL EVERY 4 HOURS PRN
Status: DISCONTINUED | OUTPATIENT
Start: 2025-01-30 | End: 2025-01-30 | Stop reason: HOSPADM

## 2025-01-30 RX ORDER — NALOXONE HYDROCHLORIDE 0.4 MG/ML
0.2 INJECTION, SOLUTION INTRAMUSCULAR; INTRAVENOUS; SUBCUTANEOUS
Status: DISCONTINUED | OUTPATIENT
Start: 2025-01-30 | End: 2025-01-30 | Stop reason: HOSPADM

## 2025-01-30 RX ORDER — AMOXICILLIN 250 MG
2 CAPSULE ORAL 2 TIMES DAILY PRN
Status: DISCONTINUED | OUTPATIENT
Start: 2025-01-30 | End: 2025-01-30 | Stop reason: HOSPADM

## 2025-01-30 RX ORDER — CEFTRIAXONE 1 G/1
1 INJECTION, POWDER, FOR SOLUTION INTRAMUSCULAR; INTRAVENOUS ONCE
Status: COMPLETED | OUTPATIENT
Start: 2025-01-30 | End: 2025-01-30

## 2025-01-30 RX ORDER — OXYCODONE HYDROCHLORIDE 5 MG/1
5 CAPSULE ORAL EVERY 4 HOURS PRN
COMMUNITY

## 2025-01-30 RX ORDER — ONDANSETRON 2 MG/ML
4 INJECTION INTRAMUSCULAR; INTRAVENOUS
Status: COMPLETED | OUTPATIENT
Start: 2025-01-30 | End: 2025-01-30

## 2025-01-30 RX ORDER — HYDROMORPHONE HYDROCHLORIDE 1 MG/ML
1 INJECTION, SOLUTION INTRAMUSCULAR; INTRAVENOUS; SUBCUTANEOUS ONCE
Status: COMPLETED | OUTPATIENT
Start: 2025-01-30 | End: 2025-01-30

## 2025-01-30 RX ORDER — ONDANSETRON 2 MG/ML
4 INJECTION INTRAMUSCULAR; INTRAVENOUS EVERY 30 MIN PRN
Status: DISCONTINUED | OUTPATIENT
Start: 2025-01-30 | End: 2025-01-30

## 2025-01-30 RX ORDER — PROCHLORPERAZINE MALEATE 10 MG
10 TABLET ORAL EVERY 6 HOURS PRN
Status: DISCONTINUED | OUTPATIENT
Start: 2025-01-30 | End: 2025-01-30 | Stop reason: HOSPADM

## 2025-01-30 RX ADMIN — ACETAMINOPHEN 650 MG: 325 TABLET, FILM COATED ORAL at 14:09

## 2025-01-30 RX ADMIN — ACETAMINOPHEN 650 MG: 325 TABLET, FILM COATED ORAL at 08:57

## 2025-01-30 RX ADMIN — LIDOCAINE HYDROCHLORIDE: 20 JELLY TOPICAL at 00:49

## 2025-01-30 RX ADMIN — CEFTRIAXONE SODIUM 1 G: 1 INJECTION, POWDER, FOR SOLUTION INTRAMUSCULAR; INTRAVENOUS at 00:47

## 2025-01-30 RX ADMIN — OXYCODONE HYDROCHLORIDE 5 MG: 5 TABLET ORAL at 14:09

## 2025-01-30 RX ADMIN — OXYCODONE HYDROCHLORIDE 5 MG: 5 TABLET ORAL at 08:57

## 2025-01-30 RX ADMIN — ONDANSETRON 4 MG: 2 INJECTION, SOLUTION INTRAMUSCULAR; INTRAVENOUS at 01:50

## 2025-01-30 RX ADMIN — HYDROMORPHONE HYDROCHLORIDE 0.5 MG: 1 INJECTION, SOLUTION INTRAMUSCULAR; INTRAVENOUS; SUBCUTANEOUS at 00:43

## 2025-01-30 RX ADMIN — HYDROMORPHONE HYDROCHLORIDE 1 MG: 1 INJECTION, SOLUTION INTRAMUSCULAR; INTRAVENOUS; SUBCUTANEOUS at 00:31

## 2025-01-30 ASSESSMENT — ACTIVITIES OF DAILY LIVING (ADL)
ADLS_ACUITY_SCORE: 41
ADLS_ACUITY_SCORE: 41
ADLS_ACUITY_SCORE: 40
ADLS_ACUITY_SCORE: 40
ADLS_ACUITY_SCORE: 41
ADLS_ACUITY_SCORE: 40
ADLS_ACUITY_SCORE: 41
ADLS_ACUITY_SCORE: 41
ADLS_ACUITY_SCORE: 40

## 2025-01-30 NOTE — DISCHARGE SUMMARY
"[facility]  Hospitalist Discharge Summary      Date of Admission:  1/29/2025  Date of Discharge:  1/30/2025  Discharging Provider: TONG Bales CNP  Discharge Service: Hospitalist Service    Discharge Diagnoses   Active Issues:  Traumatic Castellanos insertion with bleeding   Post-op urinary retention     Chronic Diagnoses   SCC of head and neck   Prediabetes   HLd  Genital herpes     Clinically Significant Risk Factors     # Overweight: Estimated body mass index is 29.44 kg/m  as calculated from the following:    Height as of this encounter: 1.727 m (5' 8\").    Weight as of this encounter: 87.8 kg (193 lb 9.6 oz).       Follow-ups Needed After Discharge   Follow-up Appointments       Follow Up ([facility])      Follow-up with [facility] in approximately 5-7 days for a voiding trial with our nursing team. Please call [phone] to schedule this visit.      You will likely continue to have bleeding around the catheter for several days, possibly the whole time the Castellanos is in place. Amount of bleeding may increase with more activity.     If you are soaking an incontinence pad/brief every hour or passing a large amount of blood with associated dizziness/lightheadedness/SOB or having any other concerns, please seek emergency care.        Hospital Follow-up with Existing Primary Care Provider (PCP)      Please see details below         Schedule Primary Care visit within: 7 Days               Unresulted Labs Ordered in the Past 30 Days of this Admission       Date and Time Order Name Status Description    1/29/2025 11:29 AM SURGICAL PATHOLOGY EXAM In process         These results will be followed up by Surgical team     Discharge Disposition   Discharged to home  Condition at discharge: Stable    Hospital Course   Jj Stallworth is a 64 year old male with past medical history significant recently diagnosed head and neck cancer who presents with urinary retention. Admitted on 1/29/2025.    "   Traumatic Chávez insertion with bleeding   Post-op urinary retention   *Presented with inability to void after outpatient oral surgery biopsy.  *Straight cath in ED with plan for TOV, after straight cath noted small amount of blood initially with subsequent clear urination  *Subsequently had Chávez placed without return of urine, balloon inflated with immediate severe pain with subsequent more substantial bleeding on subsequent removal  *Urology consulted from ED, recommended placing Chávez and observing   *Denies history of retention or significant chronic LUTS  - [facility] consulted, seen on 1/30/25   - Discharged 1/30 w/ chávez cath, plans for follow up in 5-7 days for TOV in [facility]   - flomax 0.4 mg PO daily x 10 d  - Received ceftriaxone IV x1 in ED due to trauma, defer need for further antibiotics to urology (UA neg, does not appear infectious, will hold off on further ABx)     SCC of head and neck  [facility] (Dr. Encarnacion). Work-up ongoing with additional biopsies completed 1/29 and plan for outpatient PET CT.  - Continue outpatient follow-up      Prediabetes  HgbA1c 5.9% 4/8/24. Not on medications prior to admission.   - Noted      Hyperlipidemia  - Hold prior to admission statin while observation status     Genital herpes  - Hold prior to admission valacyclovir while observation status     Consultations This Hospital Stay   UROLOGY IP CONSULT    Code Status   Full Code    Time Spent on this Encounter   I, OTNG Bales CNP, personally saw the patient today and spent greater than 30 minutes discharging this patient.       TONG Bales CNP  [facility] EXTENDED RECOVERY AND SHORT STAY  [address] [address]  Phone: [phone]  ______________________________________________________________________    Physical Exam   Vital Signs: Temp: 98  F (36.7  C) Temp src: Axillary BP: 131/66 Pulse: 88   Resp: 16 SpO2: 94 % O2 Device: None (Room  air) Oxygen Delivery: 2 LPM  Weight: 193 lbs 9.6 oz  Physical Exam  Vitals and nursing note reviewed.   HENT:      Mouth/Throat:      Mouth: Mucous membranes are moist.   Cardiovascular:      Rate and Rhythm: Normal rate and regular rhythm.   Pulmonary:      Effort: Pulmonary effort is normal.   Abdominal:      General: Abdomen is flat.      Palpations: Abdomen is soft.   Genitourinary:     Comments: Chávez in place, small amount of bloody drainage from meatus  Skin:     General: Skin is warm and dry.   Neurological:      General: No focal deficit present.      Mental Status: He is alert and oriented to person, place, and time.              Primary Care Physician   [facility]    Discharge Orders      Adult Urology [facility] Referral      Follow Up ([facility])    Follow-up with [facility] in approximately 5-7 days for a voiding trial with our nursing team. Please call [phone] to schedule this visit.      You will likely continue to have bleeding around the catheter for several days, possibly the whole time the Chávez is in place. Amount of bleeding may increase with more activity.     If you are soaking an incontinence pad/brief every hour or passing a large amount of blood with associated dizziness/lightheadedness/SOB or having any other concerns, please seek emergency care.     Reason for your hospital stay    Post operative urinary retention resolved with chávez placement.     Activity    Your activity upon discharge: activity as tolerated, bleeding may increase with activity     Diet    Follow this diet upon discharge: Current Diet:Orders Placed This Encounter      Regular Diet Adult     Hospital Follow-up with Existing Primary Care Provider (PCP)    Please see details below            Significant Results and Procedures   Results for orders placed or performed during the hospital encounter of 01/20/25   US Biopsy Lymph Node Core    Narrative    EXAM:  1. PERCUTANEOUS BIOPSY 3.2 CM  PARTIALLY NECROTIC RIGHT LEVEL II CERVICAL LYMPH NODE   2. ULTRASOUND GUIDANCE  LOCATION: [facility]  DATE: 1/20/2025    INDICATION: Right cervical lymphadenopathy     PROCEDURE: Informed consent obtained. Site marked. Prior images reviewed. Required items made available. Patient identity was confirmed verbally and with arm band. Patient reevaluated immediately before beginning the procedure. Universal protocol was   followed. Time out performed. The site was prepped and draped in sterile fashion. 10 mL of 1 percent lidocaine was infused into the local soft tissues. Using standard technique and under direct ultrasound guidance, an 18 gauge biopsy needle was used to   make 5  core biopsies. Tissue was submitted to Pathology.     The patient tolerated the procedure well. No complications.      Impression    IMPRESSION:  Status post US-guided biopsy right cervical lymph node .    Reference CPT Code: 71455, 83009       Discharge Medications   Current Discharge Medication List        START taking these medications    Details   senna-docusate (SENOKOT-S/PERICOLACE) 8.6-50 MG tablet Take 1 tablet by mouth 2 times daily as needed for constipation.  Qty: 20 tablet, Refills: 0    Associated Diagnoses: Constipation, unspecified constipation type      tamsulosin (FLOMAX) 0.4 MG capsule Take 1 capsule (0.4 mg) by mouth at bedtime.  Qty: 10 capsule, Refills: 0    Comments: Take the full course, even after your catheter has been removed.  Associated Diagnoses: Acute urinary retention           CONTINUE these medications which have NOT CHANGED    Details   atorvastatin (LIPITOR) 20 MG tablet Take 1 tablet (20 mg) by mouth daily  Qty: 90 tablet, Refills: 3    Associated Diagnoses: Hyperlipidemia with target LDL less than 130      cetirizine (ZYRTEC) 10 MG tablet Take 10 mg by mouth daily      multivitamin, therapeutic (THERA-VIT) TABS tablet Take 1 tablet by mouth daily.      oxyCODONE (OXY-IR) 5 MG capsule  Take 5 mg by mouth every 4 hours as needed for severe pain.      valACYclovir (VALTREX) 1000 mg tablet Take 1 tablet (1,000 mg) by mouth daily  Qty: 90 tablet, Refills: 3    Associated Diagnoses: Genital herpes simplex, unspecified site           Allergies   Allergies   Allergen Reactions    No Known Drug Allergy

## 2025-01-30 NOTE — ED TRIAGE NOTES
Pt presents to ED with urinary retention after an endoscopy today. Recent dx of throat cancer. Pt instructed to come to ED if he is unable to urinate today post op. Procedure was at 0700.      Triage Assessment (Adult)       Row Name 01/29/25 1838          Triage Assessment    Airway WDL WDL        Respiratory WDL    Respiratory WDL WDL        Skin Circulation/Temperature WDL    Skin Circulation/Temperature WDL WDL        Cardiac WDL    Cardiac WDL WDL        Peripheral/Neurovascular WDL    Peripheral Neurovascular WDL WDL        Cognitive/Neuro/Behavioral WDL    Cognitive/Neuro/Behavioral WDL WDL

## 2025-01-30 NOTE — H&P
[facility]    History and Physical - Hospitalist Service       Date of Admission:  1/29/2025    Assessment & Plan   Jj Stallworth is a 64 year old male with past medical history significant recently diagnosed head and neck cancer who presents with urinary retention. Admitted on 1/29/2025.     Traumatic Castellanos insertion with bleeding   Post-op urinary retention   *Presented with inability to void after outpatient intubation for biopsy.  *Straight cath in ED with plan for TOV, after straight cath noted small amount of blood initially with subsequent clear urination  *Subsequently had Castellanos placed without return of urine, balloon inflated with immediate severe pain with subsequent more substantial bleeding on subsequent removal  *Urology consulted from ED, recommended placing Castellanos and observing   *Denies history of retention or significant chronic LUTS  - [facility] consulted  - Continue Castellanos   - Received ceftriaxone IV x1 in ED due to trauma, defer need for further antibiotics to urology     SCC of head and neck  [facility] (Dr. Encarnacion). Work-up ongoing with additional biopsies completed 1/29 and plan for outpatient PET CT.  - Continue outpatient follow-up     Prediabetes  HgbA1c 5.9% 4/8/24. Not on medications prior to admission.   - Noted     Hyperlipidemia  - Hold prior to admission statin while observation status    Genital herpes  - Hold prior to admission valacyclovir while observation status        Diet: Regular Diet Adult  DVT Prophylaxis: Pneumatic Compression Devices  Castellanos Catheter: PRESENT, indication: ?  (Error. Value could not be saved.)  Code Status: Full Code    Disposition Plan   [place] to observation status.     Medically Ready for Discharge: Anticipated Today once cleared by urology       Entered: Justen Christopher MD 01/30/2025, 5:34 AM     The patient's care was discussed with the ED provider, patient      Medical Decision Making       58 MINUTES  "SPENT BY ME on the date of service doing chart review, history, exam, documentation & further activities per the note.      Clinically Significant Risk Factors Present on Admission                             # Obesity: Estimated body mass index is 30.41 kg/m  as calculated from the following:    Height as of 1/2/25: 1.727 m (5' 8\").    Weight as of 1/2/25: 90.7 kg (200 lb).                   Justen Christopher MD  [facility]    ______________________________________________________________________    Chief Complaint   Unable to urinate    History of Present Illness   Jj Stallworth is a 64 year old male who presents with the above chief complaint.    History is obtained from the patient, discussion with ED provider and review of medical record. The patient underwent outpatient endoscopy per ENT for ongoing workup of newly diagnosed squamous cell cancer of the head and neck.  He reports he was briefly intubated for this (~30 minutes) and subsequently discharged.  He was instructed to monitor for inability to urinate, he was unable to urinate for some time at home and so presented to the emergency department for evaluation of this.  After discussion with the ED provider, elected to straight cath x 1 with subsequent trial of void.  He notes after the initial straight catheterization, he is able to void a small amount which was pink-tinged.  Subsequently voided again a small amount which was normal color.  He continued to have an elevated bladder scan, and so plan was to place Castellanos and follow-up with urology as outpatient.  During Castellanos insertion, he had no urine output, balloon was inflated with subsequent severe pain.  Castellanos was removed, and he had bleeding noted from his urethra.  Case was discussed with urology, who recommended Castellanos placement and observation.  He reports the bleeding externally from the catheter has since slowed and his pain is improved.  He denies any history of urinary " retention.  He reports rare nocturia and start/stop with urination, but is not a regular occurrence.     In the Emergency Department, the patient was seen by Dr. Valle, with whom I discussed the patient's presenting symptoms and emergency department course.  Initial vital signs were a temperature of 97.8F, HR 55, /68, RR 18, SpO2 96% on room air. Pertinent work-up as noted in A&P. The patient received IV ceftriaxone, IV ondansetron, IV hydromorphone and Hospitalists were contacted for admission to the hospital.     Past Medical History    I have reviewed this patient's medical history and updated it with pertinent information if needed.   Past Medical History:   Diagnosis Date    Genital herpes     Hyperlipidemia     Osteoarthritis     Prediabetes        Past Surgical History   I have reviewed this patient's surgical history and updated it with pertinent information if needed.  Past Surgical History:   Procedure Laterality Date    COLONOSCOPY N/A 2018    Procedure: COMBINED COLONOSCOPY, SINGLE OR MULTIPLE BIOPSY/POLYPECTOMY BY BIOPSY;  COLONOSCOPY;  Surgeon: Dilip Santos MD;  Location:  GI    HEMORRHOIDECTOMY      rubber band ligation    [facility] NONSPECIFIC PROCEDURE      fatty tumor removed from posterior neck  - done at [facility] NONSPECIFIC PROCEDURE  2003    treamill stress myocardial perfusion scan - done in [place], [place] - negative for ischemia or infarct.         Social History   I have reviewed this patient's social history and updated it with pertinent information if needed.  Social History     Tobacco Use    Smoking status: Former     Current packs/day: 0.00     Average packs/day: 1 pack/day for 5.0 years (5.0 ttl pk-yrs)     Types: Cigarettes     Start date: 1973     Quit date: 1978     Years since quittin.1    Smokeless tobacco: Never   Substance Use Topics    Alcohol use: Yes     Alcohol/week: 0.0 standard drinks of alcohol      Comment: Socially- 2 drinks per week    Drug use: No        Family History   I have reviewed this patient's family history and updated it with pertinent information if needed.   Family History   Problem Relation Age of Onset    Arthritis Mother     Respiratory Mother         Bronchitis    Alcohol/Drug Father     Arthritis Father     Blood Disease Father         Partial blood clot- On blood thinner    Lipids Father         On med    Arthritis Maternal Grandmother     Arthritis Maternal Grandfather     Heart Disease Maternal Grandfather     Diabetes Paternal Grandmother     Arthritis Paternal Grandmother     Heart Disease Paternal Grandmother     Arthritis Paternal Grandfather     Cancer Paternal Grandfather         Bone Cancer    Heart Disease Paternal Grandfather     Asthma Brother     Cancer - colorectal Paternal Aunt         dx age 60    Prostate Cancer No family hx of         Prior to Admission Medications  - Pending pharmacy reconciliation   Prior to Admission Medications   Prescriptions Last Dose Informant Patient Reported? Taking?   atorvastatin (LIPITOR) 20 MG tablet   No No   Sig: Take 1 tablet (20 mg) by mouth daily   cetirizine (ZYRTEC) 10 MG tablet   Yes No   Sig: Take 10 mg by mouth daily   ibuprofen (ADVIL,MOTRIN) 800 MG tablet   No No   Sig: TAKE 1 TABLET BY MOUTH EVERY EIGHT HOURS AS NEEDED FOR PAIN   valACYclovir (VALTREX) 1000 mg tablet   No No   Sig: Take 1 tablet (1,000 mg) by mouth daily      Facility-Administered Medications: None     Allergies   Allergies   Allergen Reactions    No Known Drug Allergy        Physical Exam   Vital Signs: Temp: 97.2  F (36.2  C) Temp src: Axillary BP: 106/66 Pulse: 61   Resp: 16 SpO2: 97 % O2 Device: None (Room air) Oxygen Delivery: 2 LPM  Weight: 0 lbs 0 oz    Constitutional: Well-appearing, NAD  Respiratory: Clear to auscultation bilaterally, good air movement, normal effort   Cardiovascular: RRR, no m/r/g. No peripheral edema   GI: Soft, non-tender,  non-distended. No rebound tenderness or guarding.    Genitourinary: Dried blood on scrotum, minimal amount of fresh blood at urethral meatus.   Skin: Warm, dry   Neurologic: Alert. Responding to questions appropriately. Following commands.    Psychiatric: Normal affect, appropriate      Data   Data reviewed today: I reviewed all medications, new labs and imaging results over the last 24 hours. I personally reviewed no images or EKG's today.    Recent Labs   Lab 01/30/25  0023   WBC 12.2*   HGB 13.5   MCV 83         POTASSIUM 4.2   CHLORIDE 99   CO2 21*   BUN 14.2   CR 0.89   ANIONGAP 15   FELISA 9.2   *       No results found for this or any previous visit (from the past 24 hours).

## 2025-01-30 NOTE — PHARMACY-ADMISSION MEDICATION HISTORY
Pharmacist Admission Medication History    Admission medication history is complete. The information provided in this note is only as accurate as the sources available at the time of the update.    Information Source(s): Patient and CareEverywhere/SureScripts via phone    Pertinent Information: None    Changes made to PTA medication list:  Added: Multivitamin, oxycodone  Deleted: ibuprofen  Changed: None    Allergies reviewed with patient and updates made in EHR: yes    Medication History Completed By: Yulia Reyez RPH 1/30/2025 9:01 AM    PTA Med List   Medication Sig Last Dose/Taking    atorvastatin (LIPITOR) 20 MG tablet Take 1 tablet (20 mg) by mouth daily 1/28/2025    cetirizine (ZYRTEC) 10 MG tablet Take 10 mg by mouth daily 1/28/2025    multivitamin, therapeutic (THERA-VIT) TABS tablet Take 1 tablet by mouth daily. 1/28/2025    oxyCODONE (OXY-IR) 5 MG capsule Take 5 mg by mouth every 4 hours as needed for severe pain. 1/29/2025    valACYclovir (VALTREX) 1000 mg tablet Take 1 tablet (1,000 mg) by mouth daily 1/28/2025

## 2025-01-30 NOTE — CONSULTS
[facility]  Urology Consult Note  Name: Jj Stallworth    MRN: [id number]  YOB: 1960    Age: 64 year old  Date of admission: 1/29/2025  [facility]     Requesting Physician:  Dr. Christopher  Reason for consult:  Traumatic Castellanos, urinary retention           History of Present Illness:   Jj Stallworth is a 64 year old male, seen at the request of Dr. Christopher, who presented with urinary retention after endoscopy with biopsy for throat cancer. Appears he saw Dr. Marin in 2008 for Peyronie's disease, but no other apparent urologic history on chart review.    He underwent outpatient endoscopy with ENT for ongoing work-up of newly diagnosed squamous cell cancer of head/neck. He was unable to urinate at home after the procedure so presented to the ER. He was straight cathed x1. He was only a le to void a small amount of pink urine after straight cath. Bladder scan later with ~750cc in the bladder, so decision was made to place a Castellanos catheter. When Castellanos was inserted, there was no urine output, and he experienced severe pain when nursing staff attempted to inflate Castellanos balloon. Balloon was deflated and Castellanos removed, but he was noted to have bleeding from the urethra. The ER physician discussed with Dr. Moyer of our service, and it was recommended to place a Castellanos catheter. Patient was then admitted for observation overnight.    This morning, he reports pain is improved and bleeding around the catheter has decreased, though still having some oozing. Denies any nocturia. Voids with good stream typically. He denies past history of urinary retention. He is not on prostate/BPH medications at baseline. UA on 1/29 with 1 WBC, 9 RBC, negative nitrite, neg leuk est. Creat is 0.89.                Past Medical History:     Past Medical History:   Diagnosis Date    Genital herpes     Hyperlipidemia     Osteoarthritis     Prediabetes              Past Surgical  History:     Past Surgical History:   Procedure Laterality Date    COLONOSCOPY N/A 2018    Procedure: COMBINED COLONOSCOPY, SINGLE OR MULTIPLE BIOPSY/POLYPECTOMY BY BIOPSY;  COLONOSCOPY;  Surgeon: Dilip Santos MD;  Location:  GI    HEMORRHOIDECTOMY      rubber band ligation    [facility] NONSPECIFIC PROCEDURE      fatty tumor removed from posterior neck  - done at [facility] in [place], [place]    Z NONSPECIFIC PROCEDURE  2003    treamill stress myocardial perfusion scan - done in [place], [place] - negative for ischemia or infarct.               Social History:     Social History     Tobacco Use    Smoking status: Former     Current packs/day: 0.00     Average packs/day: 1 pack/day for 5.0 years (5.0 ttl pk-yrs)     Types: Cigarettes     Start date: 1973     Quit date: 1978     Years since quittin.1    Smokeless tobacco: Never   Substance Use Topics    Alcohol use: Yes     Alcohol/week: 0.0 standard drinks of alcohol     Comment: Socially- 2 drinks per week             Family History:     Family History   Problem Relation Age of Onset    Arthritis Mother     Respiratory Mother         Bronchitis    Alcohol/Drug Father     Arthritis Father     Blood Disease Father         Partial blood clot- On blood thinner    Lipids Father         On med    Arthritis Maternal Grandmother     Arthritis Maternal Grandfather     Heart Disease Maternal Grandfather     Diabetes Paternal Grandmother     Arthritis Paternal Grandmother     Heart Disease Paternal Grandmother     Arthritis Paternal Grandfather     Cancer Paternal Grandfather         Bone Cancer    Heart Disease Paternal Grandfather     Asthma Brother     Cancer - colorectal Paternal Aunt         dx age 60    Prostate Cancer No family hx of              Allergies:     Allergies   Allergen Reactions    No Known Drug Allergy              Medications:     Current Facility-Administered Medications   Medication Dose Route Frequency  "Provider Last Rate Last Admin             Review of Systems:   A comprehensive greater than 10 system review of systems was carried out.  Pertinent positives and negatives are noted above.  Otherwise negative for contributory info.            Physical Exam:     Blood pressure 131/66, pulse 88, temperature 98  F (36.7  C), temperature source Axillary, resp. rate 16, height 1.727 m (5' 8\"), weight 87.8 kg (193 lb 9.6 oz), SpO2 94%.    Intake/Output Summary (Last 24 hours) at 1/30/2025 0925  Last data filed at 1/30/2025 0911  Gross per 24 hour   Intake 240 ml   Output 1300 ml   Net -1060 ml     Exam:  General - Awake alert and oriented, appears stated age, spouse at bedside  Pulm - Non-labored breathing with normal respiratory effort  CVS - reg rate and rhythm, no peripheral edema   - Castellanos with clear straw colored output; scant bloody oozing noted around the catheter/urethral meatus  Neuro - CN II-XII grossly intact  Musculoskeletal - extremities with no clubbing, cyanosis or edema  Psych - responsive, alert, cooperative; oriented x3; appropriate mood and affect  External/skin - inspection reveals no rashes, lesions or ulcers, normal coloring           Data Reviewed:   No results found for this or any previous visit (from the past 24 hours).    Recent Labs   Lab 01/30/25  0023   WBC 12.2*   HGB 13.5   HCT 40.0   MCV 83        Recent Labs   Lab 01/30/25  0023      POTASSIUM 4.2   CHLORIDE 99   CO2 21*   ANIONGAP 15   *   BUN 14.2   CR 0.89   GFRESTIMATED >90   FELISA 9.2     Recent Labs   Lab 01/29/25  2309   COLOR Light Yellow   APPEARANCE Clear   URINEGLC 70*   URINEBILI Negative   URINEKETONE Trace*   SG 1.029   UBLD Negative   URINEPH 5.5   PROTEIN Negative   NITRITE Negative   LEUKEST Negative   RBCU 9*   WBCU 1         Assessment and Plan:   Jj Stallworth is a 64 year old male who presented with acute retention after outpatient endoscopy procedure. No prior history of retention. Likely with " traumatic Chávez to prostatic urethra as well given his pain on initial inflation of balloon with first Chávez attempt and subsequent bleeding per urethra.    Plan:  Continue Chávez catheter for 5-7 days to allow for bladder rest/decompression and tamponade of any urethral injury from first chávez balloon inflation  Recommend tamsulosin 0.4 mg daily for next 7-10 days  UA negative for infection, so will hold on abx. Did receive ceftriaxone x1 in ER.  Can use bacitracin or topical lidocaine to meatus for Chávez related discomfort.   Will need to keep Chávez tubing clean of blood/mucus to prevent it from sticking to meatus and causing more discomfort.  Did discuss with pt and spouse that he will likely continue to have bleeding around the catheter for several days, possibly the whole time the Chávez is in place. Amount of bleeding may increase with more activity.   If he is soaking an incontinence pad/brief every hour or passing a large amount of blood with associated dizziness/lightheadedness/SOB, should seek emergency care.  OK for discharge today from urology standpoint once cleared by IM. Follow-up with urology in 5-7 days for voiding trial in clinic.   This plan has been discussed with Dr. Moyer, who was also contacted overnight about the patient by the ER.      Urology will sign off at this time.  I am off service after today.  Please call office at [phone] to have the appropriate on-call provider paged if further questions arise.     Peyton Chu PA-C  [facility], a division of [facility]  Office Phone: [phone]  Paging: [employer] or [phone]  After 4 pm M-F or on weekends or if no answer, please call [phone].

## 2025-01-30 NOTE — ED NOTES
[facility]  ED Nurse Handoff Report    ED Chief complaint: Urinary Retention      ED Diagnosis:   Final diagnoses:   Acute urinary retention   Urethral bleeding -  Secondary to traumatic catheterization       Code Status: TBA by admitting provider    Allergies:   Allergies   Allergen Reactions    No Known Drug Allergy        Patient Story: Pt presents to ED s/t endoscopy with c/o urinary retention. Recent dx throat cx. Procedure at 0700.  Focused Assessment:  Aox4. Accompanied by wife. Unable to urinate since endoscopy this morning. Denies pain. Once Castellanos was inserted, blood clots emitted from urethra, causing pt and wife concern.     Treatments and/or interventions provided: PIV; hydromorphone; ondansetron; ceftriaxone; lidocaine; labs; straight cath; Castellanos    Labs Ordered and Resulted from Time of ED Arrival to Time of ED Departure   BASIC METABOLIC PANEL - Abnormal       Result Value    Sodium 135      Potassium 4.2      Chloride 99      Carbon Dioxide (CO2) 21 (*)     Anion Gap 15      Urea Nitrogen 14.2      Creatinine 0.89      GFR Estimate >90      Calcium 9.2      Glucose 127 (*)    CBC WITH PLATELETS AND DIFFERENTIAL - Abnormal    WBC Count 12.2 (*)     RBC Count 4.83      Hemoglobin 13.5      Hematocrit 40.0      MCV 83      MCH 28.0      MCHC 33.8      RDW 13.9      Platelet Count 231      % Neutrophils 83      % Lymphocytes 10      % Monocytes 6      % Eosinophils 0      % Basophils 0      % Immature Granulocytes 1      NRBCs per 100 WBC 0      Absolute Neutrophils 10.0 (*)     Absolute Lymphocytes 1.3      Absolute Monocytes 0.8      Absolute Eosinophils 0.0      Absolute Basophils 0.0      Absolute Immature Granulocytes 0.1      Absolute NRBCs 0.0     ROUTINE UA WITH MICROSCOPIC REFLEX TO CULTURE      Patient's response to treatments and/or interventions: Tolerated    To be done/followed up on inpatient unit:  Observation    Does this patient have any cognitive concerns?:   N/A    Activity level - Baseline/Home:  Independent  Activity Level - Current:   Independent    Patient's Preferred language: English   Needed?: No    Isolation: None  Infection: Not Applicable  Patient tested for COVID 19 prior to admission: NO  Bariatric?: No    Vital Signs:   Vitals:    01/29/25 1829 01/30/25 0043 01/30/25 0100 01/30/25 0110   BP: 109/68 111/67  109/70   BP Location:  Right arm     Patient Position:  Semi-Crawford's     Cuff Size:  Adult Regular     Pulse: 55 56  60   Resp: 18      Temp: 97.8  F (36.6  C)      SpO2: 96% 98% 98% 92%       Cardiac Rhythm:     Was the PSS-3 completed:   Yes  What interventions are required if any?               Family Comments: N/A  OBS brochure/video discussed/provided to patient/family: Yes              Name of person given brochure if not patient:               Relationship to patient:     For the majority of the shift this patient's behavior was Green.   Behavioral interventions performed were .    ED NURSE PHONE NUMBER: [phone]

## 2025-01-30 NOTE — ED PROVIDER NOTES
Emergency Department Note      History of Present Illness     Chief Complaint   Urinary Retention      HPI   Jj Stallworth is a 64 year old male with a history of throat cancer presenting with urinary retention. Jj got an endoscopy done this morning to get biopsies for his recent cancer diagnosis and has not been able to urinate since the procedure. He denies any pain in his flank, penis, or testicles.     Independent Historian   None    Review of External Notes   1/2/25 - ED note     Past Medical History     Medical History and Problem List   Chronic seasonal allergic rhinitis   Degenerative disc disease   Diverticulosis of large intestine   Genital herpes  Hyperlipidemia   Hyperplastic colon polyp   Obesity   Prediabetes   Umbilical hernia      Medications   Lipitor   Valtrex     Surgical History   Colonoscopy   Hemorrhoidectomy   Fatty tumor removal from posterior neck   Endoscopy       Physical Exam     Patient Vitals for the past 24 hrs:   BP Temp Pulse Resp SpO2   01/30/25 0100 -- -- -- -- 98 %   01/30/25 0043 111/67 -- 56 -- 98 %   01/29/25 1829 109/68 97.8  F (36.6  C) 55 18 96 %     Physical Exam  Nursing note and vitals reviewed.    Constitutional:  Appears comfortable.    HENT:    Nose normal.  No discharge.      Oral mucosa is moist.    GI:    Soft.  Mild suprapubic tenderness.      No flank pain.    Neurological:   Alert and oriented. No focal weakness.  Skin:    Skin is warm and dry. No rash noted. No diaphoresis.      No erythema. No pallor.  No lesions.  Psychiatric:   Behavior is normal. Appropriate mood and affect.     Judgment and thought content normal.     Diagnostics     Lab Results   Labs Ordered and Resulted from Time of ED Arrival to Time of ED Departure - No data to display    Imaging   No orders to display     Independent Interpretation   None    ED Course      Medications Administered   Medications   lidocaine (XYLOCAINE) 2 % external gel ( Urethral $Given 1/29/25 2032)   acetaminophen  (TYLENOL) tablet 650 mg (650 mg Oral $Given 1/29/25 2113)   lidocaine (XYLOCAINE) 2 % external gel ( Urethral $Given 1/30/25 0049)   HYDROmorphone (PF) (DILAUDID) injection 1 mg (1 mg Intravenous $Given 1/30/25 0031)   cefTRIAXone (ROCEPHIN) 1 g vial to attach to  mL bag for ADULTS or NS 50 mL bag for PEDS (1 g Intravenous $New Bag 1/30/25 0047)   HYDROmorphone (PF) (DILAUDID) injection 0.5 mg (0.5 mg Intravenous $Given 1/30/25 0043)     Procedures   Procedures     ED Course /Discussion of Management   ED Course as of 01/30/25 0107   Wed Jan 29, 2025 2015 I obtained the history and examined the patient as noted above.      2323 I rechecked and updated the patient.      Thu Jan 30, 2025   0001 RN updated me that the patient now has a significant amount of blood in his urine, and is endorsing 10/10 pain.    0031 I spoke with Dr. Castaneda, urology, regarding the patient's history and presentation in the ED today       Additional Documentation  None    Medical Decision Making / Diagnosis     CMS Diagnoses: IV Antibiotics given and/or elevated Lactate of 0 and no sepsis note found - Delete this reminder and enter the sepsis note or '.edcms' before signing chart.>>>None  Patient does not have sepsis, was given a prophylactic dose of antibiotics because of a traumatic catheterization causing bleeding.  MIPS       Castellanos catheter was inserted because acute urinary retention/bladder outlet obstruction.    MARCELINO   Jj PARKER Basim is a 64 year old male who underwent some biopsies of a cancer in his throat today and was intubated and then developed urinary retention and comes to the ER because he cannot urinate.  After discussion of putting in a catheter and going home with that he wanted to try draining the bladder and then an attempt at voiding.  The urine was drained it looked clear and he is had no urinary tract infection symptoms, he then attempted to void twice.  There was just a little tinge of blood in the first time and  the second time was clear but he could not empty his bladder.  There was still around 300 cc in the bladder and he then wanted to have a catheter placed.  An attempt was made by the nurse and she said it advanced easily she did not put force on it.  However when the got it all the way up to the hub, there was no urine return and they tried to gently blow up the balloon but it was very painful for the patient.  They then let the balloon down pulled the catheter out and then he started bleeding clots from his penis.  He was having a lot of pain and developed more urinary retention and a repeat bladder scan showed about 750 cc in the bladder.  I consulted with urology Dr. Castaneda and he felt that likely the catheter curled up in the urethra and cause some trauma to the prostatic urethra causing the bleeding.  He said it was okay to go ahead and put an 18 or 20 Armenian catheter in which was done successfully and quite easily actually.  He required Dilaudid prior to doing this.  The urine actually cleared quite quickly and was normal.  We did not have to irrigate him.  However he continued to have some bleeding around the catheter and felt a little lightheaded so is not comfortable going home and will be admitted observation overnight.  Will get urology consultation in the morning.  Because of the trauma to the urethra and the bleeding, I did give him a prophylactic dose of Rocephin 1 g IV.  His hemoglobin is 13.5 white count 12.2 basic panel is normal.  I have discussed the case with Dr. Christopher patient will be admitted.    Disposition   The patient was admitted to the hospital observation.    Diagnosis     ICD-10-CM    1. Acute urinary retention  R33.8 Adult Urology  Referral         Discharge Medications   New Prescriptions    No medications on file     Scribe Disclosure:  Love ALEMAN, am serving as a scribe at 8:35 PM on 1/29/2025 to document services personally performed by Tomasa Valle MD based  on my observations and the provider's statements to me.     I, Zonia Castillodelphine, am serving as a scribe at 9:38 PM on 1/29/2025 to document services personally performed by Tomasa Valle MD based on my observations and the provider's statements to me.      Tomasa Valle MD  01/30/25 0139

## 2025-01-30 NOTE — PLAN OF CARE
PRIMARY Concern: throat cancer diagnosis - had outpatient endoscopy for biopsies and had urinary retention   Tests/Procedures for NEXT shift: none   Consults? (Pending/following, signed-off?): urology signed off   Safety Risk CONCERNS (fall risk, behaviors, etc.): none       Where is patient from? (Home, TCU, etc.): home with spouse   Isolation/Type: none   Other Important info for next shift: minimal bleeding from the chávez insertion site  Anticipated DC date & active delays: today home   SUMMARY NOTE:  Orientation/Cognitive: A/Ox4   Observation Goals (Met/ Not Met): met   Mobility Level/Assist Equipment: IND   Antibiotics & Plan (IV/po, length of tx left): none   Pain Management: PRN oxycodone for throat/jaw pain   Tele/VS/O2: VSS on room air   ABNL Lab/BG: WBC 12.2  Diet: regular   Bowel/Bladder: cont of bowels, chávez patent with clear, yellow output    Skin Concerns: none   Drains/Devices: chávez cath in place   Patient Stated Goal for Today: discharge

## 2025-02-02 ENCOUNTER — TELEPHONE (OUTPATIENT)
Dept: NURSING | Facility: CLINIC | Age: 65
End: 2025-02-02
Payer: COMMERCIAL

## 2025-02-02 NOTE — TELEPHONE ENCOUNTER
Telephone call  Patient has a catheter he has had oozing around the catheter since it was placed today he had a bowel movement and  some blood and clots shot out.  It now just oozing a little bit around the catheter. He is having no blood in the urine. He was concerned and wondered if this was normal he has a appointment with the urologist this week.  He is gong to watch it and call back if it doesn't stop or gets worse.    Mariajose Genao RN   [facility] Nurse Advisor  8:29 AM 2/2/2025

## 2025-02-10 ENCOUNTER — OFFICE VISIT (OUTPATIENT)
Dept: FAMILY MEDICINE | Facility: CLINIC | Age: 65
End: 2025-02-10
Payer: COMMERCIAL

## 2025-02-10 VITALS
SYSTOLIC BLOOD PRESSURE: 147 MMHG | OXYGEN SATURATION: 96 % | TEMPERATURE: 98.6 F | WEIGHT: 198.9 LBS | BODY MASS INDEX: 30.14 KG/M2 | RESPIRATION RATE: 16 BRPM | HEIGHT: 68 IN | HEART RATE: 73 BPM | DIASTOLIC BLOOD PRESSURE: 82 MMHG

## 2025-02-10 DIAGNOSIS — A60.02 HERPES SIMPLEX INFECTION OF OTHER SITE OF MALE GENITAL ORGAN: ICD-10-CM

## 2025-02-10 DIAGNOSIS — R73.03 PREDIABETES: ICD-10-CM

## 2025-02-10 DIAGNOSIS — L30.4 INTERTRIGO: ICD-10-CM

## 2025-02-10 DIAGNOSIS — E78.5 DYSLIPIDEMIA: ICD-10-CM

## 2025-02-10 DIAGNOSIS — C76.0 HEAD AND NECK CANCER (H): ICD-10-CM

## 2025-02-10 DIAGNOSIS — N40.1 BENIGN PROSTATIC HYPERPLASIA WITH URINARY RETENTION: ICD-10-CM

## 2025-02-10 DIAGNOSIS — R33.8 BENIGN PROSTATIC HYPERPLASIA WITH URINARY RETENTION: ICD-10-CM

## 2025-02-10 DIAGNOSIS — R33.8 ACUTE URINARY RETENTION: Primary | ICD-10-CM

## 2025-02-10 LAB
EST. AVERAGE GLUCOSE BLD GHB EST-MCNC: 120 MG/DL
HBA1C MFR BLD: 5.8 % (ref 0–5.6)

## 2025-02-10 PROCEDURE — 90471 IMMUNIZATION ADMIN: CPT | Performed by: INTERNAL MEDICINE

## 2025-02-10 PROCEDURE — 36415 COLL VENOUS BLD VENIPUNCTURE: CPT | Performed by: INTERNAL MEDICINE

## 2025-02-10 PROCEDURE — 83036 HEMOGLOBIN GLYCOSYLATED A1C: CPT | Performed by: INTERNAL MEDICINE

## 2025-02-10 PROCEDURE — 99495 TRANSJ CARE MGMT MOD F2F 14D: CPT | Mod: 25 | Performed by: INTERNAL MEDICINE

## 2025-02-10 PROCEDURE — 90677 PCV20 VACCINE IM: CPT | Performed by: INTERNAL MEDICINE

## 2025-02-10 RX ORDER — MULTIVITAMIN WITH IRON
1 TABLET ORAL DAILY
COMMUNITY

## 2025-02-10 RX ORDER — TAMSULOSIN HYDROCHLORIDE 0.4 MG/1
0.4 CAPSULE ORAL AT BEDTIME
Qty: 90 CAPSULE | Refills: 1 | Status: SHIPPED | OUTPATIENT
Start: 2025-02-10

## 2025-02-10 ASSESSMENT — PAIN SCALES - GENERAL: PAINLEVEL_OUTOF10: MILD PAIN (2)

## 2025-02-10 NOTE — PROGRESS NOTES
"  Assessment & Plan     Acute urinary retention  Occurred after anesthesia.  Required Castellanos.  This was unfortunately traumatic.  The patient would rightfully like to avoid future complications.  He did have some mild retention symptoms prior.  I think it is reasonable to continue tamsulosin for now.  He may require further anesthesia for procedures for cancer directed therapy.      - tamsulosin (FLOMAX) 0.4 MG capsule  Dispense: 90 capsule; Refill: 1    Benign prostatic hyperplasia with urinary retention  As above.    Head and neck cancer (H)  New diagnosis.  Working with ENT.    Herpes simplex infection of other site of male genital organ  Uses daily suppressive Valtrex at 1000 mg a day.  Lower doses has not been effective for him in the past.    Dyslipidemia  On statin.  No changes made.    Prediabetes  Previously noted.  His A1c in 2022 was 6.1.  We will recheck again today.  - Hemoglobin A1c  - Hemoglobin A1c    Intertrigo  He is using over-the-counter clotrimazole with good effect.  Can be seen again if symptoms return or worsen.    We will have the patient return to clinic in 3 months for follow-up.    The longitudinal plan of care for the diagnosis(es)/condition(s) as documented were addressed during this visit. Due to the added complexity in care, I will continue to support Jj in the subsequent management and with ongoing continuity of care.      MED REC REQUIRED  Post Medication Reconciliation Status: discharge medications reconciled and changed, per note/orders  BMI  Estimated body mass index is 30.24 kg/m  as calculated from the following:    Height as of this encounter: 1.727 m (5' 8\").    Weight as of this encounter: 90.2 kg (198 lb 14.4 oz).         Subjective   Jj is a 64 year old, presenting for the following health issues:  Hospital F/U    Pt is new to me as well as the clinic.  His wife sees my partner Dr. Gonzalez.      Noted neck mass as of Jan 1.  He is seeing ENT.  Has PET scan scheduled. "     Seems to be base of the tongue cancer.    HL  On statin, no ill SE.     Genital herpes  On chronic suppressive therapy with 1000mg valtrex.  Lower doses have not been effective.       Urinary retention  Acute. Post procedure. Was on     Rash  Groin. Over the last couple weeks.            History of Present Illness       Reason for visit:  Establish primary care   He is taking medications regularly.           1/31/2025   Post Discharge Outreach   How are you doing now that you are home? Wife states he is feeling a little better but not a ton.  Had a shower today and still pretty sore. Having trouble sleeping due to catheter.  Still has some pain at insertion with catheter especially with movement.  Bleeding around the catheter due to knicking the prostate with insertion. States bleeding has decreased since hospital.   How are your symptoms? (Red Flag symptoms escalate to triage hotline per guidelines) Improved   Does the patient have their discharge instructions?  Yes   Does the patient have questions regarding their discharge instructions?  No   Were you started on any new medications or were there changes to any of your previous medications?  Yes   Does the patient have all of their medications? Yes   Do you have questions regarding any of your medications?  No   Do you have all of your needed medical supplies or equipment (DME)?  (i.e. oxygen tank, CPAP, cane, etc.) Yes   Discharge Follow Up Appointment Date 2/6/2025   Discharge Follow Up Appointment Scheduled with? Specialty Care Provider       Hospital Follow-up Visit:    Hospital/Nursing Home/IP Rehab Facility: [facility]  Date of Admission: 1/29/2025  Date of Discharge: 1/30/2025  Reason(s) for Admission:     Traumatic Castellanos insertion with bleeding   Post-op urinary retention      Chronic Diagnoses   SCC of head and neck   Prediabetes   HLd  Genital herpes       Was the patient in the ICU or did the patient experience delirium  "during hospitalization?  No  Do you have any other stressors you would like to discuss with your provider? No    Problems taking medications regularly:  None  Medication changes since discharge: None  Problems adhering to non-medication therapy:  None    Summary of hospitalization:  [facility] discharge summary reviewed  Diagnostic Tests/Treatments reviewed.  Follow up needed: as scheduled  Other Healthcare Providers Involved in Patient s Care:         Specialist appointment - as scheduled  Update since discharge: stable.         Plan of care communicated with patient                 Objective    BP (!) 147/82 (BP Location: Right arm, Patient Position: Sitting, Cuff Size: Adult Large)   Pulse 73   Temp 98.6  F (37  C) (Temporal)   Resp 16   Ht 1.727 m (5' 8\")   Wt 90.2 kg (198 lb 14.4 oz)   SpO2 96%   BMI 30.24 kg/m    Body mass index is 30.24 kg/m .  Physical Exam   GEN NAD  ENT MMM  NECK firm R LAD  CV RRR  ABD s/nt/nd   intertrigo  PSYCH: Alert pleasant and cooperative            Signed Electronically by: Armando Camacho MD    "

## 2025-02-11 ENCOUNTER — HOSPITAL ENCOUNTER (OUTPATIENT)
Dept: PET IMAGING | Facility: CLINIC | Age: 65
Discharge: HOME | End: 2025-02-11
Attending: STUDENT IN AN ORGANIZED HEALTH CARE EDUCATION/TRAINING PROGRAM
Payer: COMMERCIAL

## 2025-02-11 DIAGNOSIS — C44.92 SCC (SQUAMOUS CELL CARCINOMA): ICD-10-CM

## 2025-02-11 PROCEDURE — 78815 PET IMAGE W/CT SKULL-THIGH: CPT | Mod: PI

## 2025-02-11 PROCEDURE — 343N000001 HC RX 343 MED OP 636: Performed by: STUDENT IN AN ORGANIZED HEALTH CARE EDUCATION/TRAINING PROGRAM

## 2025-02-11 PROCEDURE — 78815 PET IMAGE W/CT SKULL-THIGH: CPT | Mod: 26 | Performed by: RADIOLOGY

## 2025-02-11 PROCEDURE — A9552 F18 FDG: HCPCS | Performed by: STUDENT IN AN ORGANIZED HEALTH CARE EDUCATION/TRAINING PROGRAM

## 2025-02-11 RX ORDER — FLUDEOXYGLUCOSE F 18 200 MCI/ML
10-18 INJECTION, SOLUTION INTRAVENOUS ONCE
Status: COMPLETED | OUTPATIENT
Start: 2025-02-11 | End: 2025-02-11

## 2025-02-11 RX ADMIN — FLUDEOXYGLUCOSE F 18 11.87 MILLICURIE: 200 INJECTION, SOLUTION INTRAVENOUS at 07:51

## 2025-03-03 ENCOUNTER — TELEPHONE (OUTPATIENT)
Dept: FAMILY MEDICINE | Facility: CLINIC | Age: 65
End: 2025-03-03

## 2025-03-03 ENCOUNTER — OFFICE VISIT (OUTPATIENT)
Dept: FAMILY MEDICINE | Facility: CLINIC | Age: 65
End: 2025-03-03
Payer: COMMERCIAL

## 2025-03-03 VITALS
OXYGEN SATURATION: 97 % | RESPIRATION RATE: 16 BRPM | WEIGHT: 195.3 LBS | HEART RATE: 60 BPM | SYSTOLIC BLOOD PRESSURE: 119 MMHG | TEMPERATURE: 97.3 F | DIASTOLIC BLOOD PRESSURE: 81 MMHG | HEIGHT: 66 IN | BODY MASS INDEX: 31.39 KG/M2

## 2025-03-03 DIAGNOSIS — C79.89 METASTATIC SQUAMOUS CELL CARCINOMA TO HEAD AND NECK (H): ICD-10-CM

## 2025-03-03 DIAGNOSIS — C02.9 SQUAMOUS CELL CANCER OF TONGUE (H): ICD-10-CM

## 2025-03-03 DIAGNOSIS — Z01.818 PREOP GENERAL PHYSICAL EXAM: Primary | ICD-10-CM

## 2025-03-03 PROCEDURE — 3074F SYST BP LT 130 MM HG: CPT | Performed by: NURSE PRACTITIONER

## 2025-03-03 PROCEDURE — 1126F AMNT PAIN NOTED NONE PRSNT: CPT | Performed by: NURSE PRACTITIONER

## 2025-03-03 PROCEDURE — 99214 OFFICE O/P EST MOD 30 MIN: CPT | Performed by: NURSE PRACTITIONER

## 2025-03-03 PROCEDURE — 3079F DIAST BP 80-89 MM HG: CPT | Performed by: NURSE PRACTITIONER

## 2025-03-03 ASSESSMENT — PAIN SCALES - GENERAL: PAINLEVEL_OUTOF10: NO PAIN (0)

## 2025-03-03 NOTE — PROGRESS NOTES
Preoperative Evaluation  [address] [address]  Phone: [phone]  Primary Provider: Armando Camacho MD  Pre-op Performing Provider: TONG Bryan CNP  Mar 3, 2025         3/1/2025   Surgical Information   What procedure is being done? Pre op for Cancer on tongue sx   Facility or Hospital where procedure/surgery will be performed: [facility]   Who is doing the procedure / surgery? Dr Jaramillo & Dr Encarnacion   Date of surgery / procedure: 3/10/25   Time of surgery / procedure: 10 am. Arrive st 8:30   Where do you plan to recover after surgery? Other - home     Fax number for surgical facility: [phone]     Assessment & Plan     The proposed surgical procedure is considered INTERMEDIATE risk.    Preop general physical exam    Squamous cell cancer of tongue (H)    Metastatic squamous cell carcinoma to head and neck (H)              - No identified additional risk factors other than previously addressed    Preoperative Medication Instructions  Antiplatelet or Anticoagulation Medication Instructions   - We reviewed the medication list and the patient is not on an antiplatelet or anticoagulation medications.    Additional Medication Instructions  We reviewed the medication list and there are no chronic medications that need to be adjusted for this procedure.   - Herbal medications and vitamins: DO NOT TAKE 14 days prior to surgery.  Avoid vitamins, supplements, ASA and NSAIDS until after the surgery  Hold medications the day of surgery until after the procedure    Recommendation  Approval given to proceed with proposed procedure, without further diagnostic evaluation.    Marshall Gardner is a 64 year old, presenting for the following:  Pre-Op Exam (transoral robotic resection right base of tongue carcinoma, right neck dissection)          3/3/2025     9:09 AM   Additional Questions   Roomed by Mara MOSS     HPI: SCC tongue with metastatic  disease in his neck.           3/1/2025   Pre-Op Questionnaire   Have you ever had a heart attack or stroke? No   Have you ever had surgery on your heart or blood vessels, such as a stent placement, a coronary artery bypass, or surgery on an artery in your head, neck, heart, or legs? No   Do you have chest pain with activity? No   Do you have a history of heart failure? No   Do you currently have a cold, bronchitis or symptoms of other infection? No   Do you have a cough, shortness of breath, or wheezing? No   Do you or anyone in your family have previous history of blood clots? (!) YES mother   Do you or does anyone in your family have a serious bleeding problem such as prolonged bleeding following surgeries or cuts? (!) No   Have you ever had problems with anemia or been told to take iron pills? No   Have you had any abnormal blood loss such as black, tarry or bloody stools? No   Have you ever had a blood transfusion? No   Are you willing to have a blood transfusion if it is medically needed before, during, or after your surgery? Yes   Have you or any of your relatives ever had problems with anesthesia? (!) YES patient had urinary retention after anesthesia   Do you have sleep apnea, excessive snoring or daytime drowsiness? No   Do you have any artifical heart valves or other implanted medical devices like a pacemaker, defibrillator, or continuous glucose monitor? No   Do you have artificial joints? No   Are you allergic to latex? No     Health Care Directive  Patient does not have a Health Care Directive: Discussed advance care planning with patient; however, patient declined at this time.    Preoperative Review of    reviewed - controlled substances reflected in medication list.          Patient Active Problem List    Diagnosis Date Noted    Acute urinary retention 01/30/2025     Priority: Medium    Urethral bleeding 01/30/2025     Priority: Medium    Prediabetes 09/01/2017     Priority: Medium    Low HDL  (under 40) 09/12/2013     Priority: Medium    Obesity 08/13/2012     Priority: Medium    Genital herpes 08/13/2012     Priority: Medium     Uses daily suppressive Valtrex at 1000 mg a day.  Lower doses has not been effective for him in the past.      Hyperplastic colon polyp 07/13/2011     Priority: Medium     9/2010, 5 year follow up colon cancer screen advised      Hyperlipidemia with target LDL less than 130 08/09/2010     Priority: Medium     Diagnosis updated by automated process. Provider to review and confirm.      Hypertriglyceridemia 08/09/2010     Priority: Medium      Past Medical History:   Diagnosis Date    Cancer (H) 1/24/25    Recent    Genital herpes     Hyperlipidemia     Osteoarthritis     Prediabetes      Past Surgical History:   Procedure Laterality Date    COLONOSCOPY N/A 4/19/2018    Procedure: COMBINED COLONOSCOPY, SINGLE OR MULTIPLE BIOPSY/POLYPECTOMY BY BIOPSY;  COLONOSCOPY;  Surgeon: Dilip Santos MD;  Location:  GI    HEMORRHOIDECTOMY  2003    rubber band ligation    [facility] NONSPECIFIC PROCEDURE  2002    fatty tumor removed from posterior neck  - done at [facility] in [facility] NONSPECIFIC PROCEDURE  July 2003    treamill stress myocardial perfusion scan - done in [place], [place] - negative for ischemia or infarct.     Current Outpatient Medications   Medication Sig Dispense Refill    atorvastatin (LIPITOR) 20 MG tablet Take 1 tablet (20 mg) by mouth daily 90 tablet 3    cetirizine (ZYRTEC) 10 MG tablet Take 10 mg by mouth daily      magnesium 250 MG tablet Take 1 tablet by mouth daily.      multivitamin, therapeutic (THERA-VIT) TABS tablet Take 1 tablet by mouth daily.      tamsulosin (FLOMAX) 0.4 MG capsule Take 1 capsule (0.4 mg) by mouth at bedtime. 90 capsule 1    valACYclovir (VALTREX) 1000 mg tablet Take 1 tablet (1,000 mg) by mouth daily 90 tablet 3       Allergies   Allergen Reactions    No Known Drug Allergy     Mixed Grasses Headache and Itching    No  "Clinical Screening - See Comments Unknown     Patient has confirmed allergy to various types of grasses.        Social History     Tobacco Use    Smoking status: Former     Current packs/day: 0.00     Average packs/day: 1 pack/day for 5.0 years (5.0 ttl pk-yrs)     Types: Cigarettes     Start date: 1973     Quit date: 1978     Years since quittin.2    Smokeless tobacco: Never   Substance Use Topics    Alcohol use: Yes     Comment: Socially- 2 drinks per week       History   Drug Use No             Review of Systems  CONSTITUTIONAL: NEGATIVE for fever, chills, change in weight  INTEGUMENTARY/SKIN: NEGATIVE for worrisome rashes, moles or lesions  ENT/MOUTH: NEGATIVE for ear, mouth and throat problems  RESP: NEGATIVE for significant cough or SOB  CV: NEGATIVE for chest pain, palpitations or peripheral edema    Objective    /81   Pulse 60   Temp 97.3  F (36.3  C) (Temporal)   Resp 16   Ht 1.68 m (5' 6.14\")   Wt 88.6 kg (195 lb 4.8 oz)   SpO2 97%   BMI 31.39 kg/m     Estimated body mass index is 31.39 kg/m  as calculated from the following:    Height as of this encounter: 1.68 m (5' 6.14\").    Weight as of this encounter: 88.6 kg (195 lb 4.8 oz).  Physical Exam  GENERAL: alert and no distress  HENT: ear canals and TM's normal, nose and mouth without ulcers or lesions  NECK: cervical adenopathy right and thyroid normal to palpation  RESP: lungs clear to auscultation - no rales, rhonchi or wheezes  CV: regular rate and rhythm, normal S1 S2, no S3 or S4, no murmur, click or rub, no peripheral edema  ABDOMEN: soft, nontender, no hepatosplenomegaly, no masses and bowel sounds normal  MS: no gross musculoskeletal defects noted, no edema    Recent Labs   Lab Test 02/10/25  1151 25  0023 25  1149 24  0902   HGB  --  13.5 15.5 14.6   PLT  --  231 241 270   NA  --  135 139 139   POTASSIUM  --  4.2 4.0 4.4   CR  --  0.89 1.08 0.96   A1C 5.8*  --   --  5.9*        Diagnostics  No labs were " ordered during this visit.   No EKG required, no history of coronary heart disease, significant arrhythmia, peripheral arterial disease or other structural heart disease.    Revised Cardiac Risk Index (RCRI)  The patient has the following serious cardiovascular risks for perioperative complications:   - No serious cardiac risks = 0 points     RCRI Interpretation: 0 points: Class I (very low risk - 0.4% complication rate)      Signed Electronically by: TONG Bryan CNP

## 2025-03-03 NOTE — PROGRESS NOTES
Faxed 3/3/25 to Dr. Jaramillo & Dr. Encarnacion, [facility] at fax # [phone].    Laisha Nguyen on 3/3/2025 at 10:03 AM

## 2025-03-03 NOTE — PATIENT INSTRUCTIONS
How to Take Your Medication Before Surgery  Preoperative Medication Instructions   Antiplatelet or Anticoagulation Medication Instructions   - We reviewed the medication list and the patient is not on an antiplatelet or anticoagulation medications.    Additional Medication Instructions  We reviewed the medication list and there are no chronic medications that need to be adjusted for this procedure.   - Herbal medications and vitamins: DO NOT TAKE 14 days prior to surgery.     Avoid aspirin and nsaid medications (alleve, ibuprofen, naproxen) until after your surgery. Tylenol is ok to take.   Patient Education   Preparing for Your Surgery  For Adults  Getting started  In most cases, a nurse will call to review your health history and instructions. They will give you an arrival time based on your scheduled surgery time. Please be ready to share:  Your doctor's clinic name and phone number  Your medical, surgical, and anesthesia history  A list of allergies and sensitivities  A list of medicines, including herbal treatments and over-the-counter drugs  Whether the patient has a legal guardian (ask how to send us the papers in advance)  Note: You may not receive a call if you were seen at our PAC (Preoperative Assessment Center).  Please tell us if you're pregnant--or if there's any chance you might be pregnant. Some surgeries may injure a fetus (unborn baby), so they require a pregnancy test. Surgeries that are safe for a fetus don't always need a test, and you can choose whether to have one.   Preparing for surgery  Within 10 to 30 days of surgery: Have a pre-op exam (sometimes called an H&P, or History and Physical). This can be done at a clinic or pre-operative center.  If you're having a , you may not need this exam. Talk to your care team.  At your pre-op exam, talk to your care team about all medicines you take. (This includes CBD oil and any drugs, such as THC, marijuana, and other forms of cannabis.) If  you need to stop any medicine before surgery, ask when to start taking it again.  This is for your safety. Many medicines and drugs can make you bleed too much during surgery. Some change how well surgery (anesthesia) drugs work.  Call your insurance company to let them know you're having surgery. (If you don't have insurance, call [phone].)  Call your clinic if there's any change in your health. This includes a scrape or scratch near the surgery site, or any signs of a cold (sore throat, runny nose, cough, rash, fever).  Eating and drinking guidelines  For your safety: Unless your surgeon tells you otherwise, follow the guidelines below.  Eat and drink as normal until 8 hours before you arrive for surgery. After that, no food or milk. You can spit out gum when you arrive.  Drink clear liquids until 2 hours before you arrive. These are liquids you can see through, like water, Gatorade, and Propel Water. They also include plain black coffee and tea (no cream or milk).  No alcohol for 24 hours before you arrive. The night before surgery, stop any drinks that contain THC.  If your care team tells you to take medicine on the morning of surgery, it's okay to take it with a sip of water. No other medicines or drugs are allowed (including CBD oil)--follow your care team's instructions.  If you have questions the day of surgery, call your hospital or surgery center.   Preventing infection  Shower or bathe the night before and the morning of surgery. Follow the instructions your clinic gave you. (If no instructions, use regular soap.)  Don't shave or clip hair near your surgery site. We'll remove the hair if needed.  Don't smoke or vape the morning of surgery. No chewing tobacco for 6 hours before you arrive. A nicotine patch is okay. You may spit out nicotine gum when you arrive.  For some surgeries, the surgeon will tell you to fully quit smoking and nicotine.  We will make every effort to keep you safe from  infection. We will:  Clean our hands often with soap and water (or an alcohol-based hand rub).  Clean the skin at your surgery site with a special soap that kills germs.  Give you a special gown to keep you warm. (Cold raises the risk of infection.)  Wear hair covers, masks, gowns, and gloves during surgery.  Give antibiotic medicine, if prescribed. Not all surgeries need this medicine.  What to bring on the day of surgery  Photo ID and insurance card  Copy of your health care directive, if you have one  Glasses and hearing aids (bring cases)  You can't wear contacts during surgery  Inhaler and eye drops, if you use them (tell us about these when you arrive)  CPAP machine or breathing device, if you use them  A few personal items, if spending the night  If you have . . .  A pacemaker, ICD (cardiac defibrillator), or other implant: Bring the ID card.  An implanted stimulator: Bring the remote control.  A legal guardian: Bring a copy of the certified (court-stamped) guardianship papers.  Please remove any jewelry, including body piercings. Leave jewelry and other valuables at home.  If you're going home the day of surgery  You must have a responsible adult drive you home. They should stay with you overnight as well.  If you don't have someone to stay with you, and you aren't safe to go home alone, we may keep you overnight. Insurance often won't pay for this.  After surgery  If it's hard to control your pain or you need more pain medicine, please call your surgeon's office.  Questions?   If you have any questions for your care team, list them here:   ____________________________________________________________________________________________________________________________________________________________________________________________________________________________________________________________  For informational purposes only. Not to replace the advice of your health care provider. Copyright   2003, 2019 [facility]. All rights reserved. Clinically reviewed by Rico Chaney MD. [employer] [id number] - REV 08/24.

## 2025-03-03 NOTE — TELEPHONE ENCOUNTER
Pre-op evaluation faxed to Dr. Jaramillo & Dr. Encarnacion at [facility] at fax # [phone].    Laisha Nguyen on 3/3/2025 at 10:07 AM

## 2025-03-10 ENCOUNTER — PATIENT OUTREACH (OUTPATIENT)
Dept: CARE COORDINATION | Facility: CLINIC | Age: 65
End: 2025-03-10
Payer: COMMERCIAL

## 2025-03-19 ENCOUNTER — TRANSCRIBE ORDERS (OUTPATIENT)
Dept: OTHER | Age: 65
End: 2025-03-19

## 2025-03-19 DIAGNOSIS — C79.89 METASTATIC SQUAMOUS CELL CARCINOMA TO HEAD AND NECK (H): Primary | ICD-10-CM

## 2025-03-24 ENCOUNTER — PATIENT OUTREACH (OUTPATIENT)
Dept: CARE COORDINATION | Facility: CLINIC | Age: 65
End: 2025-03-24
Payer: COMMERCIAL

## 2025-03-30 ASSESSMENT — ACTIVITIES OF DAILY LIVING (ADL)
WHEN_LYING_ON_THE_INVOLVED_SIDE: 3
REACHING_FOR_SOMETHING_ON_A_HIGH_SHELF: 4
PUTTING_ON_A_SHIRT_THAT_BUTTONS_DOWN_THE_FRONT: 3
TOUCHING_THE_BACK_OF_YOUR_NECK: 2
PLEASE_INDICATE_YOR_PRIMARY_REASON_FOR_REFERRAL_TO_THERAPY:: SHOULDER
PUSHING_WITH_THE_INVOLVED_ARM: 2
WASHING_YOUR_HAIR?: 2
PUTTING_ON_YOUR_PANTS: 2
PLACING_AN_OBJECT_ON_A_HIGH_SHELF: 3
PUTTING_ON_AN_UNDERSHIRT_OR_A_PULLOVER_SWEATER: 3
REMOVING_SOMETHING_FROM_YOUR_BACK_POCKET: 1
CARRYING_A_HEAVY_OBJECT_OF_10_POUNDS: 3
AT_ITS_WORST?: 2
WASHING_YOUR_BACK: 2

## 2025-03-31 ENCOUNTER — THERAPY VISIT (OUTPATIENT)
Dept: PHYSICAL THERAPY | Facility: CLINIC | Age: 65
End: 2025-03-31
Attending: OTOLARYNGOLOGY
Payer: COMMERCIAL

## 2025-03-31 DIAGNOSIS — Z47.89 ORTHOPEDIC AFTERCARE: ICD-10-CM

## 2025-03-31 DIAGNOSIS — C79.89 METASTATIC SQUAMOUS CELL CARCINOMA TO HEAD AND NECK (H): ICD-10-CM

## 2025-03-31 DIAGNOSIS — R29.898 WEAKNESS OF RIGHT SHOULDER: Primary | ICD-10-CM

## 2025-03-31 PROCEDURE — 97110 THERAPEUTIC EXERCISES: CPT | Mod: GP | Performed by: PHYSICAL THERAPIST

## 2025-03-31 PROCEDURE — 97161 PT EVAL LOW COMPLEX 20 MIN: CPT | Mod: GP | Performed by: PHYSICAL THERAPIST

## 2025-03-31 NOTE — PROGRESS NOTES
PHYSICAL THERAPY EVALUATION  Type of Visit: Evaluation              Subjective         Presenting condition or subjective complaint: shoulder weakness after surgery; 3 wks ago (3/10) had surgery to remove a tumor in base of tongue; then R cervical lymph node. Since surgery getting voice back, swallowing but notes weakness in arm.   Date of onset: 03/19/25    Relevant medical history:     Dates & types of surgery: cancer surgery    Prior diagnostic imaging/testing results: Other visual observation   Prior therapy history for the same diagnosis, illness or injury: No      Living Environment  Social support: With a significant other or spouse   Type of home: Apartment/condo   Stairs to enter the home: Yes 16 Is there a railing: Yes     Ramp: No   Stairs inside the home: No       Help at home: None  Equipment owned:     Employment: Yes realtor; R hand dominant  Hobbies/Interests: biking [employer]  Patient goals for therapy: shoulder muscles mobility  Pain assessment: See objective evaluation for additional pain details     Objective   PAIN:   Pain Location: R facial, anterior neck, along scar tissue. R shoulder generalized ache   Pain Quality: weakness  Pain is Exacerbated By: use  Pain is Relieved By: rest  Standing Alignment:    Cervical/Thoracic:  Forward head  Shoulder/UE:  Rounded shoulders and superior fossa atrophy L                                     Shoulder Evaluation:  ROM:  AROM:    Flexion:  Left:  Wnl    Right:  > 120 dec strength    Abduction:  Left: wnl   Right:  > 90 dec strength                Flexion/External Rotation:  Left:  C7    Right:  R  temple  Extension/Internal Rotation:  Left:  T12    Right:  Weakness noted; excursion comparable            Strength:    Flexion: Left:5/5     Pain:    Right:  3/5      Pain:     Abduction:  Left: 5/5    Pain:    Right: 3-/5       Pain:    Internal Rotation:  Left:5/5       Pain:    Right: 4/5       Pain:  External Rotation:   Left:5/5        Pain:   Right:3+/5       Pain:       Elbow Flexion:  Left:5/5       Pain:    Right:5/5       Pain:  Elbow Extension:  Left:5/5       Pain:    Right:5/5       Pain:      Palpation:      Right shoulder tenderness present at: Supraspinatus; Subscapularis and Upper Trap  Mobility Tests:                        Scapulothoracic right:  Hypomobile        Scapulohumeral rhythm right:  Hypomobile                                 Assessment & Plan   CLINICAL IMPRESSIONS  Medical Diagnosis: R shoulder weakness; Metastatic squamous cell carcinoma to head and neck (H)    Treatment Diagnosis: R shoulder weakness   Impression/Assessment: Patient is a 64 year old male with R shoulder weakness complaints s/p cancer excision  The following significant findings have been identified: Pain, Decreased ROM/flexibility, Decreased joint mobility, Decreased strength, Decreased proprioception, Impaired muscle performance, and Impaired posture. These impairments interfere with their ability to perform self care tasks, work tasks, recreational activities, household chores, and driving  as compared to previous level of function.     Clinical Decision Making (Complexity):  Clinical Presentation: Evolving/Changing  Clinical Presentation Rationale: based on medical and personal factors listed in PT evaluation  Clinical Decision Making (Complexity): Low complexity    PLAN OF CARE  Treatment Interventions:  Interventions: Manual Therapy, Neuromuscular Re-education, Therapeutic Activity, Therapeutic Exercise, Self-Care/Home Management    Long Term Goals     PT Goal 1  Goal Identifier: Reaching overhead  Goal Description: OH reaching w/o restriction d/t weakness  Rationale: to maximize safety and independence with performance of ADLs and functional tasks;to maximize safety and independence within the home;to maximize safety and independence with self cares  Target Date: 06/29/25      Frequency of Treatment: wkly x 8 wks to bi-monthly x 4 wks  Duration of  Treatment: up to 90 days    Education Assessment:   Learner/Method: Patient  Education Comments: edu on findings, discussed pt goals and plan of care. Discussed continuing with regular exercise and activity  Risks and benefits of evaluation/treatment have been explained.   Patient/Family/caregiver agrees with Plan of Care.   Evaluation Time:     PT Jaime Low Complexity Minutes (09171): 13  Signing Clinician: Gayla Regan PT

## 2025-04-01 PROBLEM — Z47.89 ORTHOPEDIC AFTERCARE: Status: ACTIVE | Noted: 2025-04-01

## 2025-04-01 PROBLEM — R29.898 WEAKNESS OF RIGHT SHOULDER: Status: ACTIVE | Noted: 2025-04-01

## 2025-04-07 ENCOUNTER — THERAPY VISIT (OUTPATIENT)
Dept: PHYSICAL THERAPY | Facility: CLINIC | Age: 65
End: 2025-04-07
Payer: COMMERCIAL

## 2025-04-07 DIAGNOSIS — R29.898 WEAKNESS OF RIGHT SHOULDER: Primary | ICD-10-CM

## 2025-04-07 PROCEDURE — 97110 THERAPEUTIC EXERCISES: CPT | Mod: GP

## 2025-04-23 ENCOUNTER — MYC REFILL (OUTPATIENT)
Dept: FAMILY MEDICINE | Facility: CLINIC | Age: 65
End: 2025-04-23
Payer: COMMERCIAL

## 2025-04-23 DIAGNOSIS — E78.5 HYPERLIPIDEMIA WITH TARGET LDL LESS THAN 130: ICD-10-CM

## 2025-04-24 RX ORDER — ATORVASTATIN CALCIUM 20 MG/1
20 TABLET, FILM COATED ORAL DAILY
Qty: 90 TABLET | Refills: 3 | Status: SHIPPED | OUTPATIENT
Start: 2025-04-24

## 2025-05-06 ENCOUNTER — THERAPY VISIT (OUTPATIENT)
Dept: PHYSICAL THERAPY | Facility: CLINIC | Age: 65
End: 2025-05-06
Payer: COMMERCIAL

## 2025-05-06 DIAGNOSIS — R29.898 WEAKNESS OF RIGHT SHOULDER: Primary | ICD-10-CM

## 2025-05-06 PROCEDURE — 97110 THERAPEUTIC EXERCISES: CPT | Mod: GP | Performed by: PHYSICAL THERAPIST

## 2025-05-11 ENCOUNTER — HEALTH MAINTENANCE LETTER (OUTPATIENT)
Age: 65
End: 2025-05-11

## 2025-05-12 ENCOUNTER — OFFICE VISIT (OUTPATIENT)
Dept: FAMILY MEDICINE | Facility: CLINIC | Age: 65
End: 2025-05-12
Payer: COMMERCIAL

## 2025-05-12 VITALS
HEIGHT: 66 IN | OXYGEN SATURATION: 97 % | HEART RATE: 62 BPM | RESPIRATION RATE: 14 BRPM | TEMPERATURE: 97.8 F | DIASTOLIC BLOOD PRESSURE: 72 MMHG | SYSTOLIC BLOOD PRESSURE: 110 MMHG | BODY MASS INDEX: 31.84 KG/M2 | WEIGHT: 198.1 LBS

## 2025-05-12 DIAGNOSIS — C79.89 METASTATIC SQUAMOUS CELL CARCINOMA TO HEAD AND NECK (H): Primary | ICD-10-CM

## 2025-05-12 DIAGNOSIS — N40.1 BENIGN PROSTATIC HYPERPLASIA WITH URINARY RETENTION: ICD-10-CM

## 2025-05-12 DIAGNOSIS — R33.8 BENIGN PROSTATIC HYPERPLASIA WITH URINARY RETENTION: ICD-10-CM

## 2025-05-12 DIAGNOSIS — R73.03 PREDIABETES: ICD-10-CM

## 2025-05-12 PROCEDURE — G2211 COMPLEX E/M VISIT ADD ON: HCPCS | Performed by: INTERNAL MEDICINE

## 2025-05-12 PROCEDURE — 1126F AMNT PAIN NOTED NONE PRSNT: CPT | Performed by: INTERNAL MEDICINE

## 2025-05-12 PROCEDURE — 99213 OFFICE O/P EST LOW 20 MIN: CPT | Performed by: INTERNAL MEDICINE

## 2025-05-12 PROCEDURE — 3078F DIAST BP <80 MM HG: CPT | Performed by: INTERNAL MEDICINE

## 2025-05-12 PROCEDURE — 3074F SYST BP LT 130 MM HG: CPT | Performed by: INTERNAL MEDICINE

## 2025-05-12 ASSESSMENT — PAIN SCALES - GENERAL: PAINLEVEL_OUTOF10: NO PAIN (0)

## 2025-05-12 NOTE — PROGRESS NOTES
"  Assessment & Plan     Summary Assessment:  Jj Stallworth is a 64-year-old male recovering from head and neck cancer surgery. He has post-surgical shoulder mobility issues and facial numbness but is progressing with physical therapy. His urinary retention issues have resolved, and his blood sugar levels are borderline but stable.    Metastatic squamous cell carcinoma to head and neck (H)  - Post-surgical recovery ongoing with improvements. Patient has facial numbness and drooping, but speech is understandable.     Benign prostatic hyperplasia with urinary retention  - No current urinary retention issues post-surgery. Previous retention was linked to anesthesia during endoscopy.  - Discontinue tamsulosin (Flomax) today. Monitor for increased urgency or frequency. If symptoms return, patient advised to restart medication and notify the clinic.    Prediabetes  - A1c level at 5.8% as of February 2025, slightly decreased from previous years. Blood sugar levels are borderline but not requiring medication.  - Monitor blood sugar levels. Plan for a full panel blood test in 6 months to reassess blood sugar averages and cholesterol.    Consent was obtained from the patient to use an AI documentation tool in the creation of this note.    RTC 6M CPE    The longitudinal plan of care for the diagnosis(es)/condition(s) as documented were addressed during this visit. Due to the added complexity in care, I will continue to support Jj in the subsequent management and with ongoing continuity of care.      BMI  Estimated body mass index is 31.84 kg/m  as calculated from the following:    Height as of this encounter: 1.68 m (5' 6.14\").    Weight as of this encounter: 89.9 kg (198 lb 1.6 oz).             Marshall Gardner is a 64 year old, presenting for the following health issues:  No chief complaint on file.    History of Present Illness       Reason for visit:  Surgery Followup    He eats 4 or more servings of fruits and vegetables " "daily.He consumes 1 sweetened beverage(s) daily.He exercises with enough effort to increase his heart rate 30 to 60 minutes per day.  He exercises with enough effort to increase his heart rate 4 days per week.   He is taking medications regularly.   Jj Stallworth, 64 years    Head and Neck Cancer Post-Surgery Recovery  Jj Stallworth had a robotic resection at the base of the tongue and right neck dissection on March 10, 2025, and was released on March 14, 2025. Post-surgery, he experiences some right shoulder mobility loss due to the incision and is in physical therapy, progressing from mobility to strength exercises. His tongue base is healing, and while he speaks well, he occasionally trips over words and swallows more deliberately. His face is somewhat numb, causing slight drooping, but his speech is understandable. Initial tongue swelling made speaking and swallowing difficult. Speech therapy is not yet started as deemed unnecessary.    Urinary Retention  Jj had significant urinary retention after an endoscopy with anesthesia, leading to Flomax (tamsulosin) use before his March surgery. Post-surgery, he was briefly catheterized but has had no urinary retention issues since. He reported occasional urination troubles before the endoscopy but none since the surgery.    Blood Sugar Levels  Jj's blood sugar levels are borderline, with an A1c of 5.8% in February 2025, down from 5.9% in previous years. He is aware that surgery stress can affect blood sugar levels.                  Objective    /72 (BP Location: Right arm, Patient Position: Sitting, Cuff Size: Adult Regular)   Pulse 62   Temp 97.8  F (36.6  C) (Temporal)   Resp 14   Ht 1.68 m (5' 6.14\")   Wt 89.9 kg (198 lb 1.6 oz)   SpO2 97%   BMI 31.84 kg/m    Body mass index is 31.84 kg/m .  Wt Readings from Last 3 Encounters:   05/12/25 89.9 kg (198 lb 1.6 oz)   03/03/25 88.6 kg (195 lb 4.8 oz)   02/10/25 90.2 kg (198 lb 14.4 oz)       Physical Exam   GEN " NAD  ENT MMM  CV RRR  ABD soft, +BS  PSYCH alert pleasant and cooperative.              Signed Electronically by: Armando Camacho MD

## 2025-05-20 ENCOUNTER — THERAPY VISIT (OUTPATIENT)
Dept: PHYSICAL THERAPY | Facility: CLINIC | Age: 65
End: 2025-05-20
Payer: COMMERCIAL

## 2025-05-20 DIAGNOSIS — R29.898 WEAKNESS OF RIGHT SHOULDER: Primary | ICD-10-CM

## 2025-05-20 PROCEDURE — 97110 THERAPEUTIC EXERCISES: CPT | Mod: GP | Performed by: PHYSICAL THERAPIST

## 2025-05-20 PROCEDURE — 97530 THERAPEUTIC ACTIVITIES: CPT | Mod: GP | Performed by: PHYSICAL THERAPIST

## 2025-07-01 ENCOUNTER — THERAPY VISIT (OUTPATIENT)
Dept: PHYSICAL THERAPY | Facility: CLINIC | Age: 65
End: 2025-07-01
Payer: COMMERCIAL

## 2025-07-01 DIAGNOSIS — R29.898 WEAKNESS OF RIGHT SHOULDER: Primary | ICD-10-CM

## 2025-07-01 PROCEDURE — 97110 THERAPEUTIC EXERCISES: CPT | Mod: GP | Performed by: PHYSICAL THERAPIST

## 2025-07-01 NOTE — PROGRESS NOTES
07/01/25 0500   Appointment Info   Signing clinician's name / credentials Kathleen Regan, DPT, ATC   Total/Authorized Visits 10 per PT   Visits Used 5   Medical Diagnosis R shoulder weakness; Metastatic squamous cell carcinoma to head and neck (H)   PT Tx Diagnosis R shoulder weakness   Precautions/Limitations cancer excision; post op weakness   Progress Note/Certification   Onset of illness/injury or Date of Surgery 03/19/25   Therapy Frequency wkly x 8 wks to bi-monthly x 4 wks   Predicted Duration up to 90 days   Progress Note Due Date 06/29/25   Progress Note Completed Date 07/01/25   PT Goal 1   Goal Identifier Reaching overhead   Goal Description OH reaching w/o restriction d/t weakness   Rationale to maximize safety and independence with performance of ADLs and functional tasks;to maximize safety and independence within the home;to maximize safety and independence with self cares   Goal Progress goal met   Target Date 06/29/25   Date Met 07/01/25   Subjective Report   Subjective Report Back at the gym, able to travel and kayak and fish. Shoulder feels about 90-95%.   Objective Measures   Objective Measures Objective Measure 1;Objective Measure 2;Objective Measure 3   Objective Measure 1   Objective Measure AAROM   Details AROM wnl B; slight protraction R shoulder   Objective Measure 2   Objective Measure sup fossa atrophy R vs L   Details grossly 5/5 planes of motion R shoulder; diminished supraspinatus and rhomboid strength   Treatment Interventions (PT)   Interventions Therapeutic Procedure/Exercise;Therapeutic Activity   Therapeutic Procedure/Exercise   Therapeutic Procedures: strength, endurance, ROM, flexibility minutes (11036) 30   PTRx Ther Proc 1 Scapular Retraction/Depression   PTRx Ther Proc 1 - Details No Notes   PTRx Ther Proc 2 Shoulder Theraband Rows   PTRx Ther Proc 2 - Details Blue band rev x 15   PTRx Ther Proc 3 Shoulder Theraband External Rotation   PTRx Ther Proc 3 - Details able to  re-intro today YTB   PTRx Ther Proc 4 Shoulder Theraband Internal Rotation   PTRx Ther Proc 4 - Details blue band- cued for excursion to avoid protraction/horiz add   PTRx Ther Proc 5 Shoulder Horizontal Abduction/Diagonals With Theraband   PTRx Ther Proc 5 - Details progressed to B horiz abd with YTB   PTRx Ther Proc 6 Shoulder Scaption Full Can   PTRx Ther Proc 6 - Details x 15 3 lb; good form   PTRx Ther Proc 7 Standing Push Up at Countertop   PTRx Ther Proc 7 - Details x 15; countertop   PTRx Ther Proc 8 Serratus Punch with Dumbbells   PTRx Ther Proc 8 - Details rev   PTRx Ther Proc 9 Four Corner Stretch Flexion   PTRx Ther Proc 9 - Details rev   PTRx Ther Proc 10 Pec Stretch Doorway   PTRx Ther Proc 10 - Details rev   Skilled Intervention progressed HEP to strength based rehab   Patient Response/Progress UE fatigue   Education   Learner/Method Patient   Education Comments edu on findings, discussed pt goals and plan of care. Discussed continuing with regular exercise and activity   Plan   Home program see PTRX   Updates to plan of care rev HEP; discussed goals   Plan for next session DC PT   Total Session Time   Timed Code Treatment Minutes 30   Total Treatment Time (sum of timed and untimed services) 30       DISCHARGE  Reason for Discharge: Patient has met all goals.    Equipment Issued: HEP    Discharge Plan: Patient to continue home program.    Referring Provider:  Oscar Jaramillo

## 2025-07-22 DIAGNOSIS — A60.00 GENITAL HERPES SIMPLEX, UNSPECIFIED SITE: ICD-10-CM

## 2025-07-22 RX ORDER — VALACYCLOVIR HYDROCHLORIDE 1 G/1
1000 TABLET, FILM COATED ORAL DAILY
Qty: 90 TABLET | Refills: 2 | Status: SHIPPED | OUTPATIENT
Start: 2025-07-22

## (undated) RX ORDER — FENTANYL CITRATE 50 UG/ML
INJECTION, SOLUTION INTRAMUSCULAR; INTRAVENOUS
Status: DISPENSED
Start: 2018-04-19